# Patient Record
Sex: MALE | Race: WHITE | NOT HISPANIC OR LATINO | URBAN - METROPOLITAN AREA
[De-identification: names, ages, dates, MRNs, and addresses within clinical notes are randomized per-mention and may not be internally consistent; named-entity substitution may affect disease eponyms.]

---

## 2018-01-05 PROBLEM — Z00.00 ENCOUNTER FOR PREVENTIVE HEALTH EXAMINATION: Status: ACTIVE | Noted: 2018-01-05

## 2019-07-19 ENCOUNTER — INPATIENT (INPATIENT)
Facility: HOSPITAL | Age: 59
LOS: 1 days | Discharge: HOME | DRG: 866 | End: 2019-07-21
Attending: INTERNAL MEDICINE | Admitting: INTERNAL MEDICINE
Payer: COMMERCIAL

## 2019-07-19 VITALS
TEMPERATURE: 98 F | RESPIRATION RATE: 22 BRPM | DIASTOLIC BLOOD PRESSURE: 101 MMHG | OXYGEN SATURATION: 99 % | WEIGHT: 240.08 LBS | HEIGHT: 66 IN | HEART RATE: 116 BPM | SYSTOLIC BLOOD PRESSURE: 174 MMHG

## 2019-07-19 DIAGNOSIS — R50.9 FEVER, UNSPECIFIED: ICD-10-CM

## 2019-07-19 DIAGNOSIS — I10 ESSENTIAL (PRIMARY) HYPERTENSION: ICD-10-CM

## 2019-07-19 LAB
ALBUMIN SERPL ELPH-MCNC: 4.9 G/DL — SIGNIFICANT CHANGE UP (ref 3.3–5)
ALP SERPL-CCNC: 89 U/L — SIGNIFICANT CHANGE UP (ref 40–120)
ALT FLD-CCNC: 27 U/L — SIGNIFICANT CHANGE UP (ref 10–45)
ANION GAP SERPL CALC-SCNC: 19 MMOL/L — HIGH (ref 5–17)
APPEARANCE UR: CLEAR — SIGNIFICANT CHANGE UP
APTT BLD: 30.7 SEC — SIGNIFICANT CHANGE UP (ref 27.5–36.3)
AST SERPL-CCNC: 21 U/L — SIGNIFICANT CHANGE UP (ref 10–40)
BACTERIA # UR AUTO: NEGATIVE — SIGNIFICANT CHANGE UP
BASE EXCESS BLDV CALC-SCNC: 1.2 MMOL/L — SIGNIFICANT CHANGE UP (ref -2–2)
BASOPHILS # BLD AUTO: 0.1 K/UL — SIGNIFICANT CHANGE UP (ref 0–0.2)
BASOPHILS NFR BLD AUTO: 0.7 % — SIGNIFICANT CHANGE UP (ref 0–2)
BILIRUB SERPL-MCNC: 0.5 MG/DL — SIGNIFICANT CHANGE UP (ref 0.2–1.2)
BILIRUB UR-MCNC: NEGATIVE — SIGNIFICANT CHANGE UP
BUN SERPL-MCNC: 12 MG/DL — SIGNIFICANT CHANGE UP (ref 7–23)
CA-I SERPL-SCNC: 1.14 MMOL/L — SIGNIFICANT CHANGE UP (ref 1.12–1.3)
CALCIUM SERPL-MCNC: 10.8 MG/DL — HIGH (ref 8.4–10.5)
CHLORIDE BLDV-SCNC: 108 MMOL/L — SIGNIFICANT CHANGE UP (ref 96–108)
CHLORIDE SERPL-SCNC: 94 MMOL/L — LOW (ref 96–108)
CO2 BLDV-SCNC: 26 MMOL/L — SIGNIFICANT CHANGE UP (ref 22–30)
CO2 SERPL-SCNC: 23 MMOL/L — SIGNIFICANT CHANGE UP (ref 22–31)
COLOR SPEC: COLORLESS — SIGNIFICANT CHANGE UP
CREAT SERPL-MCNC: 0.87 MG/DL — SIGNIFICANT CHANGE UP (ref 0.5–1.3)
DIFF PNL FLD: NEGATIVE — SIGNIFICANT CHANGE UP
EOSINOPHIL # BLD AUTO: 0 K/UL — SIGNIFICANT CHANGE UP (ref 0–0.5)
EOSINOPHIL NFR BLD AUTO: 0.1 % — SIGNIFICANT CHANGE UP (ref 0–6)
EPI CELLS # UR: 0 /HPF — SIGNIFICANT CHANGE UP
GAS PNL BLDV: 135 MMOL/L — SIGNIFICANT CHANGE UP (ref 135–145)
GAS PNL BLDV: SIGNIFICANT CHANGE UP
GLUCOSE BLDV-MCNC: 95 MG/DL — SIGNIFICANT CHANGE UP (ref 70–99)
GLUCOSE SERPL-MCNC: 159 MG/DL — HIGH (ref 70–99)
GLUCOSE UR QL: NEGATIVE — SIGNIFICANT CHANGE UP
HCO3 BLDV-SCNC: 25 MMOL/L — SIGNIFICANT CHANGE UP (ref 21–29)
HCT VFR BLD CALC: 44.8 % — SIGNIFICANT CHANGE UP (ref 39–50)
HCT VFR BLDA CALC: 38 % — LOW (ref 39–50)
HGB BLD CALC-MCNC: 12.5 G/DL — LOW (ref 13–17)
HGB BLD-MCNC: 15.3 G/DL — SIGNIFICANT CHANGE UP (ref 13–17)
HYALINE CASTS # UR AUTO: 0 /LPF — SIGNIFICANT CHANGE UP (ref 0–2)
INR BLD: 1.12 RATIO — SIGNIFICANT CHANGE UP (ref 0.88–1.16)
KETONES UR-MCNC: NEGATIVE — SIGNIFICANT CHANGE UP
LACTATE BLDV-MCNC: 2 MMOL/L — SIGNIFICANT CHANGE UP (ref 0.7–2)
LEUKOCYTE ESTERASE UR-ACNC: NEGATIVE — SIGNIFICANT CHANGE UP
LYMPHOCYTES # BLD AUTO: 25.7 % — SIGNIFICANT CHANGE UP (ref 13–44)
LYMPHOCYTES # BLD AUTO: 4.2 K/UL — HIGH (ref 1–3.3)
MAGNESIUM SERPL-MCNC: 1.6 MG/DL — SIGNIFICANT CHANGE UP (ref 1.6–2.6)
MCHC RBC-ENTMCNC: 30.1 PG — SIGNIFICANT CHANGE UP (ref 27–34)
MCHC RBC-ENTMCNC: 34.1 GM/DL — SIGNIFICANT CHANGE UP (ref 32–36)
MCV RBC AUTO: 88.1 FL — SIGNIFICANT CHANGE UP (ref 80–100)
MONOCYTES # BLD AUTO: 1.1 K/UL — HIGH (ref 0–0.9)
MONOCYTES NFR BLD AUTO: 6.9 % — SIGNIFICANT CHANGE UP (ref 2–14)
NEUTROPHILS # BLD AUTO: 11.1 K/UL — HIGH (ref 1.8–7.4)
NEUTROPHILS NFR BLD AUTO: 66.7 % — SIGNIFICANT CHANGE UP (ref 43–77)
NITRITE UR-MCNC: NEGATIVE — SIGNIFICANT CHANGE UP
PCO2 BLDV: 39 MMHG — SIGNIFICANT CHANGE UP (ref 35–50)
PH BLDV: 7.43 — SIGNIFICANT CHANGE UP (ref 7.35–7.45)
PH UR: 7.5 — SIGNIFICANT CHANGE UP (ref 5–8)
PHOSPHATE SERPL-MCNC: 2.1 MG/DL — LOW (ref 2.5–4.5)
PLATELET # BLD AUTO: 404 K/UL — HIGH (ref 150–400)
PO2 BLDV: 34 MMHG — SIGNIFICANT CHANGE UP (ref 25–45)
POTASSIUM BLDV-SCNC: 3.5 MMOL/L — SIGNIFICANT CHANGE UP (ref 3.5–5.3)
POTASSIUM SERPL-MCNC: 3.6 MMOL/L — SIGNIFICANT CHANGE UP (ref 3.5–5.3)
POTASSIUM SERPL-SCNC: 3.6 MMOL/L — SIGNIFICANT CHANGE UP (ref 3.5–5.3)
PROCALCITONIN SERPL-MCNC: 0.06 NG/ML — SIGNIFICANT CHANGE UP (ref 0.02–0.1)
PROT SERPL-MCNC: 8.3 G/DL — SIGNIFICANT CHANGE UP (ref 6–8.3)
PROT UR-MCNC: NEGATIVE — SIGNIFICANT CHANGE UP
PROTHROM AB SERPL-ACNC: 12.8 SEC — SIGNIFICANT CHANGE UP (ref 10–12.9)
RAPID RVP RESULT: SIGNIFICANT CHANGE UP
RBC # BLD: 5.08 M/UL — SIGNIFICANT CHANGE UP (ref 4.2–5.8)
RBC # FLD: 12.4 % — SIGNIFICANT CHANGE UP (ref 10.3–14.5)
RBC CASTS # UR COMP ASSIST: 0 /HPF — SIGNIFICANT CHANGE UP (ref 0–4)
S PYO AG SPEC QL IA: NEGATIVE — SIGNIFICANT CHANGE UP
SAO2 % BLDV: 60 % — LOW (ref 67–88)
SODIUM SERPL-SCNC: 136 MMOL/L — SIGNIFICANT CHANGE UP (ref 135–145)
SP GR SPEC: 1.01 — SIGNIFICANT CHANGE UP (ref 1.01–1.02)
TROPONIN T, HIGH SENSITIVITY RESULT: 9 NG/L — SIGNIFICANT CHANGE UP (ref 0–51)
UROBILINOGEN FLD QL: NEGATIVE — SIGNIFICANT CHANGE UP
WBC # BLD: 16.6 K/UL — HIGH (ref 3.8–10.5)
WBC # FLD AUTO: 16.6 K/UL — HIGH (ref 3.8–10.5)
WBC UR QL: 0 /HPF — SIGNIFICANT CHANGE UP (ref 0–5)

## 2019-07-19 PROCEDURE — 74177 CT ABD & PELVIS W/CONTRAST: CPT | Mod: 26

## 2019-07-19 PROCEDURE — 99284 EMERGENCY DEPT VISIT MOD MDM: CPT

## 2019-07-19 PROCEDURE — 71275 CT ANGIOGRAPHY CHEST: CPT | Mod: 26

## 2019-07-19 RX ORDER — KETOROLAC TROMETHAMINE 30 MG/ML
15 SYRINGE (ML) INJECTION ONCE
Refills: 0 | Status: DISCONTINUED | OUTPATIENT
Start: 2019-07-19 | End: 2019-07-19

## 2019-07-19 RX ORDER — ACETAMINOPHEN 500 MG
650 TABLET ORAL EVERY 6 HOURS
Refills: 0 | Status: DISCONTINUED | OUTPATIENT
Start: 2019-07-19 | End: 2019-07-21

## 2019-07-19 RX ORDER — SODIUM,POTASSIUM PHOSPHATES 278-250MG
1 POWDER IN PACKET (EA) ORAL ONCE
Refills: 0 | Status: COMPLETED | OUTPATIENT
Start: 2019-07-19 | End: 2019-07-19

## 2019-07-19 RX ORDER — HEPARIN SODIUM 5000 [USP'U]/ML
5000 INJECTION INTRAVENOUS; SUBCUTANEOUS EVERY 12 HOURS
Refills: 0 | Status: DISCONTINUED | OUTPATIENT
Start: 2019-07-19 | End: 2019-07-21

## 2019-07-19 RX ORDER — ACETAMINOPHEN 500 MG
650 TABLET ORAL ONCE
Refills: 0 | Status: DISCONTINUED | OUTPATIENT
Start: 2019-07-19 | End: 2019-07-19

## 2019-07-19 RX ORDER — CEFTRIAXONE 500 MG/1
1000 INJECTION, POWDER, FOR SOLUTION INTRAMUSCULAR; INTRAVENOUS EVERY 24 HOURS
Refills: 0 | Status: DISCONTINUED | OUTPATIENT
Start: 2019-07-19 | End: 2019-07-21

## 2019-07-19 RX ORDER — LOSARTAN POTASSIUM 100 MG/1
50 TABLET, FILM COATED ORAL DAILY
Refills: 0 | Status: DISCONTINUED | OUTPATIENT
Start: 2019-07-19 | End: 2019-07-21

## 2019-07-19 RX ORDER — SODIUM CHLORIDE 9 MG/ML
3500 INJECTION INTRAMUSCULAR; INTRAVENOUS; SUBCUTANEOUS ONCE
Refills: 0 | Status: COMPLETED | OUTPATIENT
Start: 2019-07-19 | End: 2019-07-19

## 2019-07-19 RX ORDER — MAGNESIUM SULFATE 500 MG/ML
2 VIAL (ML) INJECTION ONCE
Refills: 0 | Status: COMPLETED | OUTPATIENT
Start: 2019-07-19 | End: 2019-07-19

## 2019-07-19 RX ADMIN — Medication 1 PACKET(S): at 13:16

## 2019-07-19 RX ADMIN — Medication 100 MILLIGRAM(S): at 14:53

## 2019-07-19 RX ADMIN — SODIUM CHLORIDE 3500 MILLILITER(S): 9 INJECTION INTRAMUSCULAR; INTRAVENOUS; SUBCUTANEOUS at 11:59

## 2019-07-19 RX ADMIN — Medication 15 MILLIGRAM(S): at 11:59

## 2019-07-19 RX ADMIN — CEFTRIAXONE 100 MILLIGRAM(S): 500 INJECTION, POWDER, FOR SOLUTION INTRAMUSCULAR; INTRAVENOUS at 14:53

## 2019-07-19 RX ADMIN — Medication 50 GRAM(S): at 13:16

## 2019-07-19 RX ADMIN — HEPARIN SODIUM 5000 UNIT(S): 5000 INJECTION INTRAVENOUS; SUBCUTANEOUS at 18:39

## 2019-07-19 NOTE — ED ADULT NURSE NOTE - OBJECTIVE STATEMENT
58 year old A&Ox3 male presents to ED with steady coordinated gait complaining of "sweating and illness since July 4th." PMH HTN and elevated WBC, has been worked up by heme onc. Confirms night sweats, 10lb weight loss in past week, and generalized malaise x 3 weeks. Patient went to PCP last week for same complaints, prescribed amoxicillin, currently on day 6 with little improvement.  Confirms intermittent SOB along with productive cough, lungs CTA bilaterally, presents tachycardic in low 100s and febrile, [facility] [place] aware. Last dose of tylenol at 7am. Denies CP, n/v/d, fevers, chills, abdominal pain, urinary symptoms, weakness, fatigue, numbness, tingling in upper and lower extremities, HA, blurry vision. VSS updated on plan of care.

## 2019-07-19 NOTE — CONSULT NOTE ADULT - ASSESSMENT
58 year old male with hx of leukocytosis, prior work up is negative x 2 as per pt, who presents with a febrile ill, cough and malaise which started one week ago.   he was started on a recent empiric course of amoxicillin this week that was prescribed by his PMD, in the ED he was found to have a fever to 100.7 and he also had a CT of chest abdomen and pelvis, the CT of chest was negative for PE  and there was no mention of pneumonia   Noted that his white count was 16K, but his base line is 15K as per patient.   He currently feels better   Not clear dx at this time, but a viral illness is in the ddx , he also has blood cultures that are in progress, bacteremia?     Plan   will start empiric CTX 1 g IV q24 and doxycycline 100 mg po bid pending further cx data and also follow up blood cx and respiratory viral panel   continue supportive care as per medicine service

## 2019-07-19 NOTE — ED PROVIDER NOTE - PROGRESS NOTE DETAILS
PT Hr improved hr 98, Pt no longer diaphoretic,feels improved. ct/s pending  Marv Saleh MD, Facep PA Elvis: disc OSCAR Leal: PMD Dr. singh called ED attending Dr. Saleh who recommended admission to Dr. Thomposn for further w/u given persistent symptoms for three weeks

## 2019-07-19 NOTE — H&P ADULT - HISTORY OF PRESENT ILLNESS
59 y/o male PMHx HTN presented to the ED c/o productive cough white sputum x1 week. Patient stated he has been having chills, night sweats, throat pain, generalized malaise and 10 pound unintentional weight loss in last 2 weeks. Patient started to have productive cough this week with white sputum production with no improvement of amoxicillin he was prescribed by PMD. Patient has had diarrhea since he started the amoxicillin. 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 Patient started to feel SOB today and exacerbated with ambulation. Patient denied travel outside the counter, CP, abdominal pain, N/V, rash, headache, neck pain, dizziness, weakness, sick contacts, hiking, living in heavily wooded areas . Patient stated he is currently being worked up by heme/onc for leukocytosis seen on outpatient labs. Patient smokes marijuana and uses a vape. Patient took Tylenol this morning prior to arrival

## 2019-07-19 NOTE — ED ADULT NURSE NOTE - CAS TRG GENERAL NORM CIRC DET
GONIO TESTING HAS BEEN ORDERED AND PERFORMED TODAY TO EVALUATE THE ANGLES FOR THE DIAGNOSIS OF GLAUCOMA SUSPECT. ANGLES ARE OPEN TODAY. Strong peripheral pulses/Capillary refill less/equal to 2 seconds

## 2019-07-19 NOTE — ED PROVIDER NOTE - OBJECTIVE STATEMENT
59 y/o male PMHx HTN presented to the ED c/o productive cough white sputum x1 week. Patient stated he has been having chills, night sweats, throat pain, generalized malaise and 10 pound unintentional weight loss in last 2 weeks. Patient started to have productive cough this week with white sputum production with no improvement of amoxicillin he was prescribed by PMD. Patient has had diarrhea since he started the amoxicillin. 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 Patient started to feel SOB today and exacerbated with ambulation. Patient denied travel outside the counter, CP, abdominal pain, N/V, rash, headache, neck pain, dizziness, weakness, sick contacts, hiking, living in heavily wooded areas . Patient stated he is currently being worked up by heme/onc for leukocytosis seen on outpatient labs. Patient smokes marijuana and uses a vape 57 y/o male PMHx HTN presented to the ED c/o productive cough white sputum x1 week. Patient stated he has been having chills, night sweats, throat pain, generalized malaise and 10 pound unintentional weight loss in last 2 weeks. Patient started to have productive cough this week with white sputum production with no improvement of amoxicillin he was prescribed by PMD. Patient has had diarrhea since he started the amoxicillin. 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 Patient started to feel SOB today and exacerbated with ambulation. Patient denied travel outside the counter, CP, abdominal pain, N/V, rash, headache, neck pain, dizziness, weakness, sick contacts, hiking, living in heavily wooded areas . Patient stated he is currently being worked up by heme/onc for leukocytosis seen on outpatient labs. Patient smokes marijuana and uses a vape. Patient took Tylenol this morning prior to arrival

## 2019-07-19 NOTE — H&P ADULT - ASSESSMENT
58 m with    Fever  - ESR, CRP  - panculture  - empiric antibiotics  - ID evaluation    Splenic lesion  - Echocardiogram  - US spleen    HTN  - control    Further action as per clinical course   Faheem Thompson MD pager [phone]

## 2019-07-19 NOTE — CONSULT NOTE ADULT - SUBJECTIVE AND OBJECTIVE BOX
HPI:   Patient is a 58y male past medical history of leukocytosis, prior heme work up have been negative, baseline white count he reports is 15K, who presents with chief complaint of malaise, cough  no feeling well and sweats at home which started last week. He reports that he went to his PMD this week and he was prescribed amoxicillin for a possible upper respiratory infection, but he reports that he was told that it was not clear. He started to take amoxicillin but report he developed diarrhea. He reports that the stool was not watery and at was only a few times. He denies any recent travel history. Two months ago he reports he went to [place] for vacation he stayed at a resort. In the ER he was noted to have a fever to 100.7, He also received in ER toradol, tylenol and fluids and he reports that he is feeling better now.     REVIEW OF SYSTEMS:  All other review of systems negative (Comprehensive ROS)    PAST MEDICAL & SURGICAL HISTORY:  HTN (hypertension)      Allergies    No Known Allergies    Intolerances            FAMILY HISTORY: non contributory       SOCIAL HISTORY:  Smoking:  he reports that he only smokes marijuana     T(F): 100.7 (19 @ 11:51), Max: 100.7 (19 @ 11:51)  HR: 81 (19 @ 13:45)  BP: 146/75 (19 @ 13:45)  RR: 16 (19 @ 13:45)  SpO2: 98% (19 @ 13:45)  Wt(kg): --    PHYSICAL EXAM:  General: alert, no acute distress  Eyes:  anicteric, no conjunctival injection, no discharge  Oropharynx: no lesions or injection 	  Neck: supple, without adenopathy  Lungs: clear to auscultation  Heart: regular rate and rhythm; no murmur, rubs or gallops  Abdomen: soft, nondistended, nontender, without mass or organomegaly  Skin: no lesions  Extremities: no clubbing, cyanosis, or edema  Neurologic: alert, oriented, moves all extremities    LAB RESULTS:                        15.3   16.6  )-----------( 404      ( 2019 11:50 )             44.8         136  |  94<L>  |  12  ----------------------------<  159<H>  3.6   |  23  |  0.87    Ca    10.8<H>      2019 11:50  Phos  2.1       Mg     1.6         TPro  8.3  /  Alb  4.9  /  TBili  0.5  /  DBili  x   /  AST  21  /  ALT  27  /  AlkPhos  89      LIVER FUNCTIONS - ( 2019 11:50 )  Alb: 4.9 g/dL / Pro: 8.3 g/dL / ALK PHOS: 89 U/L / ALT: 27 U/L / AST: 21 U/L / GGT: x           Urinalysis Basic - ( 2019 13:45 )    Color: Colorless / Appearance: Clear / S.010 / pH: x  Gluc: x / Ketone: Negative  / Bili: Negative / Urobili: Negative   Blood: x / Protein: Negative / Nitrite: Negative   Leuk Esterase: Negative / RBC: 0 /hpf / WBC 0 /HPF   Sq Epi: x / Non Sq Epi: 0 /hpf / Bacteria: Negative        MICROBIOLOGY:  RECENT CULTURES:        RADIOLOGY REVIEWED:      Antimicrobials Day #      Other Medications:

## 2019-07-19 NOTE — ED PROVIDER NOTE - ATTENDING CONTRIBUTION TO CARE
Private Physician Panchito Cramer  58y male pmh HTN, Prior hx of chronic high WBC (15k), Cannibus. pt comes to ed complains of sweats, shakes, cold sweats onset past week. With weght loss. Pt missed work past two days with fatigue with  cough with whitish sputum. Seen by pmd and tx with Amoxil without improvement. No hemoplthysis, no nausea and vomiting  has mild diarrhea after abx. SOB/MONSIVAIS. PE WDWN male looking mildly ill normocephalic atraumatic chest clear anterior & posterior abd soft +bs no mass guarding. Neruo no focal defects. CV no rubs, gallops or murmurs  Marv Saleh MD, Facep

## 2019-07-19 NOTE — ED ADULT NURSE NOTE - NSIMPLEMENTINTERV_GEN_ALL_ED
Implemented All Universal Safety Interventions:  [place] to call system. Call bell, personal items and telephone within reach. Instruct patient to call for assistance. Room bathroom lighting operational. Non-slip footwear when patient is off stretcher. Physically safe environment: no spills, clutter or unnecessary equipment. Stretcher in lowest position, wheels locked, appropriate side rails in place.

## 2019-07-19 NOTE — H&P ADULT - NSHPLABSRESULTS_GEN_ALL_CORE
15.3   16.6  )-----------( 404      ( 2019 11:50 )             44.8           136  |  94<L>  |  12  ----------------------------<  159<H>  3.6   |  23  |  0.87    Ca    10.8<H>      2019 11:50  Phos  2.1       Mg     1.6         TPro  8.3  /  Alb  4.9  /  TBili  0.5  /  DBili  x   /  AST  21  /  ALT  27  /  AlkPhos  89                Urinalysis Basic - ( 2019 13:45 )    Color: Colorless / Appearance: Clear / S.010 / pH: x  Gluc: x / Ketone: Negative  / Bili: Negative / Urobili: Negative   Blood: x / Protein: Negative / Nitrite: Negative   Leuk Esterase: Negative / RBC: 0 /hpf / WBC 0 /HPF   Sq Epi: x / Non Sq Epi: 0 /hpf / Bacteria: Negative        PT/INR - ( 2019 11:50 )   PT: 12.8 sec;   INR: 1.12 ratio         PTT - ( 2019 11:50 )  PTT:30.7 sec    < from: CT Abdomen and Pelvis w/ IV Cont (19 @ 13:29) >    Impression: No pulmonary embolus is noted within the main,right and left   main, lobar and proximal segmental pulmonary artery branches bilaterally.   Evaluation of the mid to distal segmental and subsegmental pulmonary   artery branches is limited.     2 cm low-attenuation lesion in the spleen is indeterminate based on this   exam. Further evaluation with sonogram is recommended.    < end of copied text >

## 2019-07-19 NOTE — ED PROVIDER NOTE - CLINICAL SUMMARY MEDICAL DECISION MAKING FREE TEXT BOX
Pt with 3wk+ hx (according to pmd) cough weight loss sweats fatigue weakness,not responding to opt abx now in ed with fever diaphoresis. Concerns for fuo/occult infection, TBA culture. Inpatient workup.  Marv Saleh MD, Facep

## 2019-07-19 NOTE — H&P ADULT - NSHPPHYSICALEXAM_GEN_ALL_CORE
PHYSICAL EXAMINATION:  Vital Signs Last 24 Hrs  T(C): 36.9 (19 Jul 2019 17:03), Max: 38.2 (19 Jul 2019 11:51)  T(F): 98.4 (19 Jul 2019 17:03), Max: 100.7 (19 Jul 2019 11:51)  HR: 75 (19 Jul 2019 17:03) (75 - 116)  BP: 148/- (19 Jul 2019 17:03) (146/75 - 184/110)  BP(mean): --  RR: 18 (19 Jul 2019 17:03) (16 - 22)  SpO2: 98% (19 Jul 2019 17:03) (96% - 100%)  CAPILLARY BLOOD GLUCOSE          GENERAL: NAD, well-groomed, well-developed  HEAD:  atraumatic, normocephalic  EYES: sclera anicteric  ENMT: mucous membranes moist  NECK: supple, No JVD  CHEST/LUNG: clear to auscultation bilaterally; no rales, rhonchi, or wheezing b/l  HEART: normal S1, S2  ABDOMEN: BS+, soft, ND, NT   EXTREMITIES:  pulses palpable; no clubbing, cyanosis, or edema b/l LEs  NEURO: awake, alert, interactive; moves all extremities  SKIN: no rashes or lesions

## 2019-07-19 NOTE — PATIENT PROFILE ADULT - NSPROHMCARDIOMGMTSTRAT_GEN_A_NUR
coping strategies/activity/diet modification/exercise/medication therapy/routine screening/weight management/adequate rest/fluid modification

## 2019-07-19 NOTE — H&P ADULT - NSHPREVIEWOFSYSTEMS_GEN_ALL_CORE
REVIEW OF SYSTEMS:    CONSTITUTIONAL: + weakness, + fevers no chills  EYES/ENT: No visual changes;  No vertigo or throat pain   NECK: No pain or stiffness  RESPIRATORY: No cough, wheezing, hemoptysis; No shortness of breath  CARDIOVASCULAR: No chest pain or palpitations  GASTROINTESTINAL: No abdominal or epigastric pain. No nausea, vomiting, or hematemesis; No diarrhea or constipation. No melena or hematochezia.  GENITOURINARY: No dysuria, frequency or hematuria  NEUROLOGICAL: No numbness or weakness  SKIN: No itching, burning, rashes, or lesions   All other review of systems is negative unless indicated above.

## 2019-07-20 LAB
ANION GAP SERPL CALC-SCNC: 15 MMOL/L — SIGNIFICANT CHANGE UP (ref 5–17)
BUN SERPL-MCNC: 12 MG/DL — SIGNIFICANT CHANGE UP (ref 7–23)
CALCIUM SERPL-MCNC: 8.7 MG/DL — SIGNIFICANT CHANGE UP (ref 8.4–10.5)
CHLORIDE SERPL-SCNC: 102 MMOL/L — SIGNIFICANT CHANGE UP (ref 96–108)
CO2 SERPL-SCNC: 22 MMOL/L — SIGNIFICANT CHANGE UP (ref 22–31)
CREAT SERPL-MCNC: 0.91 MG/DL — SIGNIFICANT CHANGE UP (ref 0.5–1.3)
CRP SERPL-MCNC: 0.3 MG/DL — SIGNIFICANT CHANGE UP (ref 0–0.4)
CULTURE RESULTS: NO GROWTH — SIGNIFICANT CHANGE UP
ERYTHROCYTE [SEDIMENTATION RATE] IN BLOOD: 13 MM/HR — SIGNIFICANT CHANGE UP (ref 0–20)
GLUCOSE SERPL-MCNC: 106 MG/DL — HIGH (ref 70–99)
HCT VFR BLD CALC: 37.6 % — LOW (ref 39–50)
HCV AB S/CO SERPL IA: 0.11 S/CO — SIGNIFICANT CHANGE UP (ref 0–0.99)
HCV AB SERPL-IMP: SIGNIFICANT CHANGE UP
HGB BLD-MCNC: 12.4 G/DL — LOW (ref 13–17)
MCHC RBC-ENTMCNC: 28.7 PG — SIGNIFICANT CHANGE UP (ref 27–34)
MCHC RBC-ENTMCNC: 33 GM/DL — SIGNIFICANT CHANGE UP (ref 32–36)
MCV RBC AUTO: 87 FL — SIGNIFICANT CHANGE UP (ref 80–100)
PLATELET # BLD AUTO: 330 K/UL — SIGNIFICANT CHANGE UP (ref 150–400)
POTASSIUM SERPL-MCNC: 3.7 MMOL/L — SIGNIFICANT CHANGE UP (ref 3.5–5.3)
POTASSIUM SERPL-SCNC: 3.7 MMOL/L — SIGNIFICANT CHANGE UP (ref 3.5–5.3)
RBC # BLD: 4.32 M/UL — SIGNIFICANT CHANGE UP (ref 4.2–5.8)
RBC # FLD: 13.6 % — SIGNIFICANT CHANGE UP (ref 10.3–14.5)
SODIUM SERPL-SCNC: 139 MMOL/L — SIGNIFICANT CHANGE UP (ref 135–145)
SPECIMEN SOURCE: SIGNIFICANT CHANGE UP
WBC # BLD: 13.44 K/UL — HIGH (ref 3.8–10.5)
WBC # FLD AUTO: 13.44 K/UL — HIGH (ref 3.8–10.5)

## 2019-07-20 RX ORDER — ACETAMINOPHEN 500 MG
0 TABLET ORAL
Qty: 0 | Refills: 0 | DISCHARGE

## 2019-07-20 RX ORDER — LOSARTAN/HYDROCHLOROTHIAZIDE 100MG-25MG
1 TABLET ORAL
Qty: 0 | Refills: 0 | DISCHARGE

## 2019-07-20 RX ADMIN — CEFTRIAXONE 100 MILLIGRAM(S): 500 INJECTION, POWDER, FOR SOLUTION INTRAMUSCULAR; INTRAVENOUS at 13:48

## 2019-07-20 RX ADMIN — LOSARTAN POTASSIUM 50 MILLIGRAM(S): 100 TABLET, FILM COATED ORAL at 05:06

## 2019-07-20 RX ADMIN — Medication 100 MILLIGRAM(S): at 13:47

## 2019-07-20 RX ADMIN — HEPARIN SODIUM 5000 UNIT(S): 5000 INJECTION INTRAVENOUS; SUBCUTANEOUS at 05:06

## 2019-07-20 RX ADMIN — Medication 100 MILLIGRAM(S): at 01:06

## 2019-07-20 RX ADMIN — HEPARIN SODIUM 5000 UNIT(S): 5000 INJECTION INTRAVENOUS; SUBCUTANEOUS at 20:43

## 2019-07-21 ENCOUNTER — TRANSCRIPTION ENCOUNTER (OUTPATIENT)
Age: 59
End: 2019-07-21

## 2019-07-21 VITALS
DIASTOLIC BLOOD PRESSURE: 78 MMHG | TEMPERATURE: 98 F | HEART RATE: 68 BPM | OXYGEN SATURATION: 95 % | SYSTOLIC BLOOD PRESSURE: 147 MMHG | RESPIRATION RATE: 18 BRPM

## 2019-07-21 DIAGNOSIS — D73.9 DISEASE OF SPLEEN, UNSPECIFIED: ICD-10-CM

## 2019-07-21 LAB
CULTURE RESULTS: SIGNIFICANT CHANGE UP
HCT VFR BLD CALC: 39.2 % — SIGNIFICANT CHANGE UP (ref 39–50)
HGB BLD-MCNC: 12.9 G/DL — LOW (ref 13–17)
MCHC RBC-ENTMCNC: 29.6 PG — SIGNIFICANT CHANGE UP (ref 27–34)
MCHC RBC-ENTMCNC: 32.9 GM/DL — SIGNIFICANT CHANGE UP (ref 32–36)
MCV RBC AUTO: 89.9 FL — SIGNIFICANT CHANGE UP (ref 80–100)
PLATELET # BLD AUTO: 334 K/UL — SIGNIFICANT CHANGE UP (ref 150–400)
RBC # BLD: 4.36 M/UL — SIGNIFICANT CHANGE UP (ref 4.2–5.8)
RBC # FLD: 13.5 % — SIGNIFICANT CHANGE UP (ref 10.3–14.5)
SPECIMEN SOURCE: SIGNIFICANT CHANGE UP
WBC # BLD: 14.54 K/UL — HIGH (ref 3.8–10.5)
WBC # FLD AUTO: 14.54 K/UL — HIGH (ref 3.8–10.5)

## 2019-07-21 PROCEDURE — 85730 THROMBOPLASTIN TIME PARTIAL: CPT

## 2019-07-21 PROCEDURE — 96374 THER/PROPH/DIAG INJ IV PUSH: CPT | Mod: XU

## 2019-07-21 PROCEDURE — 87040 BLOOD CULTURE FOR BACTERIA: CPT

## 2019-07-21 PROCEDURE — 99285 EMERGENCY DEPT VISIT HI MDM: CPT | Mod: 25

## 2019-07-21 PROCEDURE — 87486 CHLMYD PNEUM DNA AMP PROBE: CPT

## 2019-07-21 PROCEDURE — 96375 TX/PRO/DX INJ NEW DRUG ADDON: CPT | Mod: XU

## 2019-07-21 PROCEDURE — 84132 ASSAY OF SERUM POTASSIUM: CPT

## 2019-07-21 PROCEDURE — 82803 BLOOD GASES ANY COMBINATION: CPT

## 2019-07-21 PROCEDURE — 84484 ASSAY OF TROPONIN QUANT: CPT

## 2019-07-21 PROCEDURE — 87581 M.PNEUMON DNA AMP PROBE: CPT

## 2019-07-21 PROCEDURE — 80048 BASIC METABOLIC PNL TOTAL CA: CPT

## 2019-07-21 PROCEDURE — 83605 ASSAY OF LACTIC ACID: CPT

## 2019-07-21 PROCEDURE — 84145 PROCALCITONIN (PCT): CPT

## 2019-07-21 PROCEDURE — 87086 URINE CULTURE/COLONY COUNT: CPT

## 2019-07-21 PROCEDURE — 87633 RESP VIRUS 12-25 TARGETS: CPT

## 2019-07-21 PROCEDURE — 85610 PROTHROMBIN TIME: CPT

## 2019-07-21 PROCEDURE — 87798 DETECT AGENT NOS DNA AMP: CPT

## 2019-07-21 PROCEDURE — 82330 ASSAY OF CALCIUM: CPT

## 2019-07-21 PROCEDURE — 84295 ASSAY OF SERUM SODIUM: CPT

## 2019-07-21 PROCEDURE — 74177 CT ABD & PELVIS W/CONTRAST: CPT

## 2019-07-21 PROCEDURE — 87880 STREP A ASSAY W/OPTIC: CPT

## 2019-07-21 PROCEDURE — 85014 HEMATOCRIT: CPT

## 2019-07-21 PROCEDURE — 83880 ASSAY OF NATRIURETIC PEPTIDE: CPT

## 2019-07-21 PROCEDURE — 93005 ELECTROCARDIOGRAM TRACING: CPT

## 2019-07-21 PROCEDURE — 81001 URINALYSIS AUTO W/SCOPE: CPT

## 2019-07-21 PROCEDURE — 85652 RBC SED RATE AUTOMATED: CPT

## 2019-07-21 PROCEDURE — 71275 CT ANGIOGRAPHY CHEST: CPT

## 2019-07-21 PROCEDURE — 85027 COMPLETE CBC AUTOMATED: CPT

## 2019-07-21 PROCEDURE — 82947 ASSAY GLUCOSE BLOOD QUANT: CPT

## 2019-07-21 PROCEDURE — 82435 ASSAY OF BLOOD CHLORIDE: CPT

## 2019-07-21 PROCEDURE — 87081 CULTURE SCREEN ONLY: CPT

## 2019-07-21 PROCEDURE — 84100 ASSAY OF PHOSPHORUS: CPT

## 2019-07-21 PROCEDURE — 80053 COMPREHEN METABOLIC PANEL: CPT

## 2019-07-21 PROCEDURE — 86803 HEPATITIS C AB TEST: CPT

## 2019-07-21 PROCEDURE — 86140 C-REACTIVE PROTEIN: CPT

## 2019-07-21 PROCEDURE — 83735 ASSAY OF MAGNESIUM: CPT

## 2019-07-21 RX ADMIN — Medication 100 MILLIGRAM(S): at 01:59

## 2019-07-21 RX ADMIN — HEPARIN SODIUM 5000 UNIT(S): 5000 INJECTION INTRAVENOUS; SUBCUTANEOUS at 09:12

## 2019-07-21 RX ADMIN — LOSARTAN POTASSIUM 50 MILLIGRAM(S): 100 TABLET, FILM COATED ORAL at 05:29

## 2019-07-21 NOTE — DISCHARGE NOTE PROVIDER - NSDCCPCAREPLAN_GEN_ALL_CORE_FT
PRINCIPAL DISCHARGE DIAGNOSIS  Diagnosis: Fever of unknown origin  Assessment and Plan of Treatment:       SECONDARY DISCHARGE DIAGNOSES  Diagnosis: HTN (hypertension)  Assessment and Plan of Treatment: HTN (hypertension) PRINCIPAL DISCHARGE DIAGNOSIS  Diagnosis: Fever of unknown origin  Assessment and Plan of Treatment: - cultures are negative   - discontinue antibiotics  - ID cleared for DC as per d/w Dr. Ford  will f/u with pmd in 2-3 days      SECONDARY DISCHARGE DIAGNOSES  Diagnosis: HTN (hypertension)  Assessment and Plan of Treatment: HTN (hypertension)  controlled throughout admission  c/w home meds      Diagnosis: Splenic lesion  Assessment and Plan of Treatment: Splenic lesion will be worked up as OTP d/w Dr. Cramer  - Echocardiogram as out-pt  - US spleen as out-pt

## 2019-07-21 NOTE — PROGRESS NOTE ADULT - PROVIDER SPECIALTY LIST ADULT
Gastroenterology
Gastroenterology
Infectious Disease
Infectious Disease
Internal Medicine
Internal Medicine
Gastroenterology

## 2019-07-21 NOTE — DISCHARGE NOTE PROVIDER - HOSPITAL COURSE
This  is a 58y old male who presents on 7/19 with a chief complaint of  fever cough anorexia weight loss 2 weeks; with additional hx of leukocytosis admitted with fever chills temp 100. Pt with iv fluids for dehydration with improvement. ID following for evaluation to rule out viral syndrome sepsis endocarditis. Pt. had - blood cultures, urine, cxray. Antibiotics as per id doxycycline and  much improved, afebrile after 48 hours. For HTN (hypertension) pt. monitored throughout admission. On 7/21  - seen by This is a 58y old male who presents on 7/19 with a chief complaint of  fever cough anorexia weight loss 2 weeks; with additional hx of leukocytosis admitted with fever chills temp 100. Pt with iv fluids for dehydration with improvement. ID following for evaluation to rule out viral syndrome sepsis endocarditis. Pt. had - blood cultures, urine, cxray. Antibiotics as per id doxycycline / ceftriaxone and clinically pt. improved, afebrile after 48 hours. For HTN (hypertension) pt. monitored throughout admission. On 7/21 - seen by Dr. Thompson with fever resolved, cultures are negative, dc'd antibiotics. As per ID cleared for DC after d/w Dr. Ford. Findings of splenic lesion will be worked up as out-pt. as per d/w Dr. Cramer. Additionally pt. will have f/u with  Echocardiogram and U/S spleen as an out-pt. As above HTN controlled and will c/w home meds. Dc'd  home on 7/21, will see PMD in 2-3 days. QA This is a 58y old male who presents on 7/19 with a chief complaint of  fever cough anorexia weight loss 2 weeks; with additional hx of leukocytosis admitted with fever chills temp 100. Pt with iv fluids for dehydration with improvement. ID following for evaluation to rule out viral syndrome sepsis endocarditis. Pt. had - blood cultures, urine, cxray. Antibiotics as per id doxycycline / ceftriaxone and clinically pt. improved, afebrile after 48 hours. For HTN (hypertension) pt. monitored throughout admission. On 7/21 - seen by Dr. Thompson with fever resolved, cultures are negative, dc'd antibiotics. As per ID cleared for DC after d/w Dr. Ford. Findings of splenic lesion will be worked up as out-pt. as per d/w Dr. Cramer. Additionally pt. will have f/u with  Echocardiogram and U/S spleen as an out-pt. As above HTN controlled and will c/w home meds. Dc'd  home on 7/21, will see PMD in 2-3 days.

## 2019-07-21 NOTE — PROGRESS NOTE ADULT - PROBLEM SELECTOR PLAN 1
hydration iv fluids blood urine cultures  id consult echo JUAN PABLO as indicated  cbc cp resp panel pending start broad spectrum antibiotics
hydration iv fluids blood urine cultures  id consult echo JUAN PABLO as indicated  cbc cp resp panel
hydration iv fluids blood urine cultures  id consult echo JUAN PABLO as indicated  cbc cp resp panel  neg pending start broad spectrum antibiotics wbc down  discharge on antibiotics further work up as outpatient if cleared by id

## 2019-07-21 NOTE — PROGRESS NOTE ADULT - ASSESSMENT
pt with hx lwukocytosis admitted with fever chills temp 100plus chills dehydrated for evaluation rule out viral syndrome sepsis endocarditis   id consult blood cultures  urine cxrayantibiotics as per id doxycycline plus after cultures
58 m with    Fever  - cultures pending   - empiric antibiotics  - ID evaluation    Splenic lesion  - Echocardiogram  - US spleen    HTN  - control  Faheem Thompson MD pager [phone]
58 m with    Fever resolved  - cultures are negative   - discontinue antibiotics  - ID cleared for DC. d/w Dr. Ford    Splenic lesion will be worked up as OTP d/w Dr. Cramer  - Echocardiogram as OTP  - US spleen as OTP    HTN  - control    DC home. Follow with PMD in 2-3 days. LALITO Thompson MD pager [phone]
58 year old male with hx of leukocytosis, prior work up is negative x 2 as per pt, who presents with a febrile ill, cough and malaise which started one week ago.   he was started on a recent empiric course of amoxicillin this week that was prescribed by his PMD, in the ED he was found to have a fever to 100.7 and he also had a CT of chest abdomen and pelvis, the CT of chest was negative for PE  and there was no mention of pneumonia   Noted that his white count was 13K,  He currently feels better   Not clear dx at this time, but a viral illness is in the ddx   reviewed his RVP is reported as not detected   micro reviewed and his blood culture results are pending  Clinically he feels better, and fever have resolved, and he is non toxic appearing     Plan   day # 2 of antibiotics   continue with ctx and doxycycline for now pending blood cx results
58 year old male with hx of leukocytosis, prior work up is negative x 2 as per pt, who presents with a febrile ill, cough and malaise which started one week ago.   he was started on a recent empiric course of amoxicillin this week that was prescribed by his PMD, in the ED he was found to have a fever to 100.7 and he also had a CT of chest abdomen and pelvis, the CT of chest was negative for PE  and there was no mention of pneumonia   Noted that his white count was 14  i suspect the patient may have had a viral illness now resolved  reviewed his RVP is reported as not detected   micro reviewed and his blood culture reported as negative     Plan   day # 3 of antibiotics   no bacterial infection found, his cultures were negative, DC antibiotics   reviewed case with Dr Thompson no objection for DC planning from ID point of view
pt with hx leukocytosis admitted with fever chills temp 100plus chills dehydrated for evaluation rule out viral syndrome sepsis endocarditis   id consult blood cultures  urine cxrayantibiotics as per id doxycycline plus after cultures  much improved afebrile 48 hours
pt with hx lwukocytosis admitted with fever chills temp 100plus chills dehydrated for evaluation rule out viral syndrome sepsis endocarditis   id consult blood cultures  urine cxrayantibiotics as per id doxycycline plus after cultures

## 2019-07-21 NOTE — PROGRESS NOTE ADULT - SUBJECTIVE AND OBJECTIVE BOX
Subjective: Patient is a 58y old  Male who presents with a chief complaint of  fever cough anorexia weight loss 2 weeks, has hx of leukocytosis for several years work up heme negative seen in er 2 weeks ago ct abdomen chest negativen amoxacillin no relief seen by me  no improvement hospitalize    PAST MEDICAL & SURGICAL HISTORY:  HTN (hypertension)      Allergies    No Known Allergies    Intolerances        MEDICATIONS  (STANDING):  cefTRIAXone   IVPB 1000 milliGRAM(s) IV Intermittent every 24 hours  doxycycline hyclate Capsule 100 milliGRAM(s) Oral every 12 hours    MEDICATIONS  (PRN):      CONSTITUTIONALfatigue temps 100 10lb weight lss  EYES: No eye pain, visual disturbances, or discharge  ENMT:  No difficulty hearing, tinnitus, vertigo; No sinus or throat pain  NECK: No pain or stiffness  BREASTS: No pain, masses, or nipple discharge  RESPIRATORY: No cough, wheezing, chills or hemoptysis; No shortness of breath  CARDIOVASCULAR: No chest pain, palpitations, dizziness, or leg swelling  GASTROINTESTINAL: No abdominal or epigastric pain. No vomiting, or hematemesis; No diarrhea or constipation. No melena or hematochezia. anorexic  some mausea  GENITOURINARY: No dysuria, frequency, hematuria, or incontinence  NEUROLOGICAL: No headaches, memory loss, loss of strength, numbness, or tremors  SKIN: No itching, burning, rashes, or lesions   LYMPH NODES: No enlarged glands  ENDOCRINE: No heat or cold intolerance; No hair loss  MUSCULOSKELETAL: No joint pain or swelling; No muscle, back, or extremity pain  PSYCHIATRIC: No depression, anxiety, mood swings, or difficulty sleeping  HEME/LYMPH: No easy bruising, or bleeding gums  ALLERY AND IMMUNOLOGIC: No hives or eczema      PHYSICAL EXAM:    GENERAL: Comfortable, no acute distress   HEAD:  Normocephalic, atraumatic  EYES: EOMI, PERRLA  HEENT: Moist mucous membranes  NECK: Supple, No JVD  NERVOUS SYSTEM:  Alert & Oriented X3, Motor Strength 5/5 B/L upper and lower extremities  CHEST/LUNG: Clear to auscultation bilaterally  HEART: Regular rate and rhythm  ABDOMEN: Soft, Nontender, Nondistended, Bowel sounds present  GENITOURINARY: Voiding, no palpable bladder  EXTREMITIES:   No clubbing, cyanosis, or edema  MUSCULOSKELTAL- No muscle tenderness, no joint tenderness  SKIN-no rash            Vital Signs Last 24 Hrs  T(C): 36.9 (2019 14:45), Max: 38.2 (2019 11:51)  T(F): 98.5 (2019 14:45), Max: 100.7 (2019 11:51)  HR: 89 (2019 14:45) (81 - 116)  BP: 169/79 (2019 14:45) (146/75 - 184/110)  BP(mean): --  RR: 18 (2019 14:45) (16 - 22)  SpO2: 99% (2019 14:45) (98% - 100%)      LABS:        136  |  94<L>  |  12  ----------------------------<  159<H>  3.6   |  23  |  0.87    Ca    10.8<H>      2019 11:50  Phos  2.1       Mg     1.6         TPro  8.3  /  Alb  4.9  /  TBili  0.5  /  DBili  x   /  AST  21  /  ALT  27  /  AlkPhos  89      CBC Full  -  ( 2019 11:50 )  WBC Count : 16.6 K/uL  RBC Count : 5.08 M/uL  Hemoglobin : 15.3 g/dL  Hematocrit : 44.8 %  Platelet Count - Automated : 404 K/uL  Mean Cell Volume : 88.1 fl  Mean Cell Hemoglobin : 30.1 pg  Mean Cell Hemoglobin Concentration : 34.1 gm/dL  Auto Neutrophil # : 11.1 K/uL  Auto Lymphocyte # : 4.2 K/uL  Auto Monocyte # : 1.1 K/uL  Auto Eosinophil # : 0.0 K/uL  Auto Basophil # : 0.1 K/uL  Auto Neutrophil % : 66.7 %  Auto Lymphocyte % : 25.7 %  Auto Monocyte % : 6.9 %  Auto Eosinophil % : 0.1 %  Auto Basophil % : 0.7 %    Urinalysis Basic - ( 2019 13:45 )    Color: Colorless / Appearance: Clear / S.010 / pH: x  Gluc: x / Ketone: Negative  / Bili: Negative / Urobili: Negative   Blood: x / Protein: Negative / Nitrite: Negative   Leuk Esterase: Negative / RBC: 0 /hpf / WBC 0 /HPF   Sq Epi: x / Non Sq Epi: 0 /hpf / Bacteria: Negative      LIVER FUNCTIONS - ( 2019 11:50 )  Alb: 4.9 g/dL / Pro: 8.3 g/dL / ALK PHOS: 89 U/L / ALT: 27 U/L / AST: 21 U/L / GGT: x                 < from: CT Abdomen and Pelvis w/ IV Cont (19 @ 13:29) >  EXAM:  CT ABDOMEN AND PELVIS IC                          EXAM:  CT ANGIO CHEST (W)AW IC                            PROCEDURE DATE:  2019            INTERPRETATION:  Clinical information: Fever. Shortness of breath.   Evaluate for pulmonary embolus.    CT angiogram of the chest was obtained following administration of   intravenous contrast. CT scan of the abdomen was also obtained.   Approximately 125-cc of Omnipaque 350 was administered and 25 cc was   discarded. Coronal, sagittal and MIP images were submitted for review.    No hilar and/or mediastinal adenopathy is noted.     Heart is normal in size. No pericardial effusion is noted. Pulmonary   arteries are normal in caliber. No filling defects are noted within the   main, right and left main, lobar and proximal segmental pulmonary artery   branches bilaterally. Evaluation of the mid to distal segmental and   subsegmental pulmonary artery branches is limited due to poor   opacification.     No endobronchial lesions are noted. 0.2 cm noncalcified nodule is noted   in the right upper lobe. Few tiny calcified granulomas are noted in the   left upper lobe. No pleural effusions are noted.    Liver, pancreas, gallbladder and both adrenal glands are unremarkable. A   2 cm low-attenuation lesion is noted in the spleen.     Both kidneys enhance with contrast. No hydronephrosis is noted.     No retroperitoneal adenopathy is noted.     Stool is noted throughout the large bowel. Appendix is visualized and is   unremarkable.     Examination of the pelvis demonstrate no free fluid.     Degenerative changes of the spine are noted. 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    Impression: No pulmonary embolus is noted within the main,right and left   main, lobar and proximal segmental pulmonary artery branches bilaterally.   Evaluation of the mid to distal segmental and subsegmental pulmonary   artery branches is limited.     2 cm low-attenuation lesion in the spleen is indeterminate based on this   exam. Further evalua    Rapid Respiratory Viral Panel (19 @ 12:17)    Rapid RVP Result: NotDetec: This Respiratory Panel uses polymerase chain reaction (PCR) to detect for  adenovirus; coronavirus (HKU1, NL63, 229E, OC43); human metapneumovirus  (hMPV); human enterovirus/rhinovirus (Entero/RV); influenza A; influenza  A/H1; influenza A/H3; influenza A/H1-2009; influenza B; parainfluenza  viruses 1, 2, 3, 4; respiratory syncytial virus; Mycoplasma pneumoniae;  and Chlamydophila pneumoniae.        Imaging Studies:Strep (Rapid) Group A.: Negative (19 @ 13:57)        Impression / Plan:
CC: f/u for fevers, fevers have resolved     Patient reports feeling better today     REVIEW OF SYSTEMS:  All other review of systems negative (Comprehensive ROS)      T(F): 98.5 (19 @ 04:40), Max: 98.7 (19 @ 21:14)  HR: 61 (19 @ 04:40)  BP: 133/81 (19 @ 04:40)  RR: 17 (19 @ 04:40)  SpO2: 99% (19 @ 04:40)  Wt(kg): --    PHYSICAL EXAM:  General: alert, no acute distress  Eyes:  anicteric, no conjunctival injection, no discharge  Oropharynx: no lesions or injection 	  Neck: supple, without adenopathy  Lungs: clear to auscultation  Heart: regular rate and rhythm; no murmur, rubs or gallops  Abdomen: soft, nondistended, nontender, without mass or organomegaly  Skin: no lesions  Extremities: no clubbing, cyanosis, or edema  Neurologic: alert, oriented, moves all extremities    LAB RESULTS:                        12.4   13.44 )-----------( 330      ( 2019 10:59 )             37.6     07-20    139  |  102  |  12  ----------------------------<  106<H>  3.7   |  22  |  0.91    Ca    8.7      2019 06:10  Phos  2.1       Mg     1.6         TPro  8.3  /  Alb  4.9  /  TBili  0.5  /  DBili  x   /  AST  21  /  ALT  27  /  AlkPhos  89      LIVER FUNCTIONS - ( 2019 11:50 )  Alb: 4.9 g/dL / Pro: 8.3 g/dL / ALK PHOS: 89 U/L / ALT: 27 U/L / AST: 21 U/L / GGT: x           Urinalysis Basic - ( 2019 13:45 )    Color: Colorless / Appearance: Clear / S.010 / pH: x  Gluc: x / Ketone: Negative  / Bili: Negative / Urobili: Negative   Blood: x / Protein: Negative / Nitrite: Negative   Leuk Esterase: Negative / RBC: 0 /hpf / WBC 0 /HPF   Sq Epi: x / Non Sq Epi: 0 /hpf / Bacteria: Negative      MICROBIOLOGY:  RECENT CULTURES:      RADIOLOGY REVIEWED:        Antimicrobials Day #    cefTRIAXone   IVPB 1000 milliGRAM(s) IV Intermittent every 24 hours  doxycycline hyclate Capsule 100 milliGRAM(s) Oral every 12 hours    Other Medications Reviewed
CC: f/u for fevers, fevers have resolved     Patient with no complaints he reports he is leaving home today     REVIEW OF SYSTEMS:  All other review of systems negative (Comprehensive ROS)    Vital Signs Last 24 Hrs  T(C): 36.8 (2019 05:02), Max: 37.1 (2019 21:19)  T(F): 98.2 (2019 05:02), Max: 98.7 (2019 21:19)  HR: 68 (2019 05:02) (67 - 80)  BP: 147/78 (2019 05:02) (147/78 - 159/85)  BP(mean): --  RR: 18 (2019 05:02) (17 - 18)  SpO2: 95% (2019 05:02) (95% - 97%)    PHYSICAL EXAM:  General: alert, no acute distress  Eyes:  anicteric, no conjunctival injection, no discharge  Oropharynx: no lesions or injection 	  Neck: supple, without adenopathy  Lungs: clear to auscultation  Heart: regular rate and rhythm; no murmur, rubs or gallops  Abdomen: soft, nondistended, nontender, without mass or organomegaly  Skin: no lesions  Extremities: no clubbing, cyanosis, or edema  Neurologic: alert, oriented, moves all extremities    LAB RESULTS:                        12.9   14.54 )-----------( 334      ( 2019 10:23 )             39.2                           12.4   13.44 )-----------( 330      ( 2019 10:59 )             37.6     07-20    139  |  102  |  12  ----------------------------<  106<H>  3.7   |  22  |  0.91    Ca    8.7      2019 06:10  Phos  2.1     07-  Mg     1.6     -19    TPro  8.3  /  Alb  4.9  /  TBili  0.5  /  DBili  x   /  AST  21  /  ALT  27  /  AlkPhos  89  07-19    LIVER FUNCTIONS - ( 2019 11:50 )  Alb: 4.9 g/dL / Pro: 8.3 g/dL / ALK PHOS: 89 U/L / ALT: 27 U/L / AST: 21 U/L / GGT: x           Urinalysis Basic - ( 2019 13:45 )    Color: Colorless / Appearance: Clear / S.010 / pH: x  Gluc: x / Ketone: Negative  / Bili: Negative / Urobili: Negative   Blood: x / Protein: Negative / Nitrite: Negative   Leuk Esterase: Negative / RBC: 0 /hpf / WBC 0 /HPF   Sq Epi: x / Non Sq Epi: 0 /hpf / Bacteria: Negative      MICROBIOLOGY:  RECENT CULTURES:      RADIOLOGY REVIEWED:        Antimicrobials Day #    cefTRIAXone   IVPB 1000 milliGRAM(s) IV Intermittent every 24 hours  doxycycline hyclate Capsule 100 milliGRAM(s) Oral every 12 hours    Other Medications Reviewed
Patient is a 58y old  Male who presents with a chief complaint of fever, weakness (2019 05:43)      SUBJECTIVE / OVERNIGHT EVENTS: Comfortable without new complaints. Wants to go home.  Review of Systems  chest pain no  palpitations no  sob no  nausea no  headache no    MEDICATIONS  (STANDING):  cefTRIAXone   IVPB 1000 milliGRAM(s) IV Intermittent every 24 hours  doxycycline hyclate Capsule 100 milliGRAM(s) Oral every 12 hours  heparin  Injectable 5000 Unit(s) SubCutaneous every 12 hours  losartan 50 milliGRAM(s) Oral daily    MEDICATIONS  (PRN):  acetaminophen   Tablet .. 650 milliGRAM(s) Oral every 6 hours PRN Temp greater or equal to 38.5C (101.3F), Mild Pain (1 - 3)      Vital Signs Last 24 Hrs  T(C): 36.8 (2019 05:02), Max: 37.1 (2019 21:19)  T(F): 98.2 (2019 05:02), Max: 98.7 (2019 21:19)  HR: 68 (2019 05:02) (67 - 80)  BP: 147/78 (2019 05:02) (147/78 - 159/85)  BP(mean): --  RR: 18 (2019 05:02) (17 - 18)  SpO2: 95% (2019 05:02) (95% - 97%)    PHYSICAL EXAM:  GENERAL: NAD, well-developed  HEAD:  Atraumatic, Normocephalic  EYES: EOMI, PERRLA, conjunctiva and sclera clear  NECK: Supple, No JVD  CHEST/LUNG: Clear to auscultation bilaterally; No wheeze  HEART: Regular rate and rhythm; No murmurs, rubs, or gallops  ABDOMEN: Soft, Nontender, Nondistended; Bowel sounds present  EXTREMITIES:  2+ Peripheral Pulses, No clubbing, cyanosis, or edema  PSYCH: AAOx3  NEUROLOGY: non-focal  SKIN: No rashes or lesions    LABS:                        12.9   14.54 )-----------( 334      ( 2019 10:23 )             39.2     07-20    139  |  102  |  12  ----------------------------<  106<H>  3.7   |  22  |  0.91    Ca    8.7      2019 06:10  Phos  2.1       Mg     1.6         TPro  8.3  /  Alb  4.9  /  TBili  0.5  /  DBili  x   /  AST  21  /  ALT  27  /  AlkPhos  89  -    PT/INR - ( 2019 11:50 )   PT: 12.8 sec;   INR: 1.12 ratio         PTT - ( 2019 11:50 )  PTT:30.7 sec      Urinalysis Basic - ( 2019 13:45 )    Color: Colorless / Appearance: Clear / S.010 / pH: x  Gluc: x / Ketone: Negative  / Bili: Negative / Urobili: Negative   Blood: x / Protein: Negative / Nitrite: Negative   Leuk Esterase: Negative / RBC: 0 /hpf / WBC 0 /HPF   Sq Epi: x / Non Sq Epi: 0 /hpf / Bacteria: Negative        Culture - Urine (collected 2019 18:37)  Source: .Urine  Final Report (2019 17:10):    No growth    Culture - Group A Streptococcus (collected 2019 18:15)  Source: .Throat  Final Report (2019 10:58):    No Streptococcus pyogenes (Group A) isolated    Culture - Blood (collected 2019 15:04)  Source: .Blood  Preliminary Report (2019 16:01):    No growth to date.    Culture - Blood (collected 2019 15:04)  Source: .Blood  Preliminary Report (2019 16:01):    No growth to date.        RADIOLOGY & ADDITIONAL TESTS:    Imaging Personally Reviewed:    Consultant(s) Notes Reviewed:      Care Discussed with Consultants/Other Providers:
Patient is a 58y old  Male who presents with a chief complaint of fever, weakness (2019 12:39)      SUBJECTIVE / OVERNIGHT EVENTS: Comfortable without new complaints.   Review of Systems  chest pain no  palpitations no  sob no  nausea no  headache no    MEDICATIONS  (STANDING):  cefTRIAXone   IVPB 1000 milliGRAM(s) IV Intermittent every 24 hours  doxycycline hyclate Capsule 100 milliGRAM(s) Oral every 12 hours  heparin  Injectable 5000 Unit(s) SubCutaneous every 12 hours  losartan 50 milliGRAM(s) Oral daily    MEDICATIONS  (PRN):  acetaminophen   Tablet .. 650 milliGRAM(s) Oral every 6 hours PRN Temp greater or equal to 38.5C (101.3F), Mild Pain (1 - 3)      Vital Signs Last 24 Hrs  T(C): 36.9 (2019 04:40), Max: 37.1 (2019 21:14)  T(F): 98.5 (2019 04:40), Max: 98.7 (2019 21:14)  HR: 61 (2019 04:40) (61 - 86)  BP: 133/81 (2019 04:40) (133/81 - 148/70)  BP(mean): --  RR: 17 (2019 04:40) (16 - 18)  SpO2: 99% (2019 04:40) (95% - 99%)    PHYSICAL EXAM:  GENERAL: NAD, well-developed  HEAD:  Atraumatic, Normocephalic  EYES: EOMI, PERRLA, conjunctiva and sclera clear  NECK: Supple, No JVD  CHEST/LUNG: Clear to auscultation bilaterally; No wheeze  HEART: Regular rate and rhythm; No murmurs, rubs, or gallops  ABDOMEN: Soft, Nontender, Nondistended; Bowel sounds present  EXTREMITIES:  2+ Peripheral Pulses, No clubbing, cyanosis, or edema  PSYCH: AAOx3  NEUROLOGY: non-focal  SKIN: No rashes or lesions    LABS:                        12.4   13.44 )-----------( 330      ( 2019 10:59 )             37.6     07-20    139  |  102  |  12  ----------------------------<  106<H>  3.7   |  22  |  0.91    Ca    8.7      2019 06:10  Phos  2.1     -  Mg     1.6         TPro  8.3  /  Alb  4.9  /  TBili  0.5  /  DBili  x   /  AST  21  /  ALT  27  /  AlkPhos  89  -    PT/INR - ( 2019 11:50 )   PT: 12.8 sec;   INR: 1.12 ratio         PTT - ( 2019 11:50 )  PTT:30.7 sec      Urinalysis Basic - ( 2019 13:45 )    Color: Colorless / Appearance: Clear / S.010 / pH: x  Gluc: x / Ketone: Negative  / Bili: Negative / Urobili: Negative   Blood: x / Protein: Negative / Nitrite: Negative   Leuk Esterase: Negative / RBC: 0 /hpf / WBC 0 /HPF   Sq Epi: x / Non Sq Epi: 0 /hpf / Bacteria: Negative          RADIOLOGY & ADDITIONAL TESTS:    Imaging Personally Reviewed:    Consultant(s) Notes Reviewed:      Care Discussed with Consultants/Other Providers:
Subjective: Patient is a 58y old  Male who presents with a chief complaint of  fever cough anorexia weight loss 2 weeks, has hx of leukocytosis for several years work up heme negative seen in er 2 weeks ago ct abdomen chest negativen amoxacillin no relief seen by me  no improvement hospitalized doing much better afebrile now 48 hours    PAST MEDICAL & SURGICAL HISTORY:  HTN (hypertension)      Allergies    No Known Allergies    Intolerances        MEDICATIONS  (STANDING):  cefTRIAXone   IVPB 1000 milliGRAM(s) IV Intermittent every 24 hours  doxycycline hyclate Capsule 100 milliGRAM(s) Oral every 12 hours    MEDICATIONS  (PRN):      CONSTITUTIONALfatigue temps 10lb weight lss  EYES: No eye pain, visual disturbances, or discharge  ENMT:  No difficulty hearing, tinnitus, vertigo; No sinus or throat pain  NECK: No pain or stiffness  BREASTS: No pain, masses, or nipple discharge  RESPIRATORY: No cough, wheezing, chills or hemoptysis; No shortness of breath  CARDIOVASCULAR: No chest pain, palpitations, dizziness, or leg swelling  GASTROINTESTINAL: No abdominal or epigastric pain. No vomiting, or hematemesis; No diarrhea or constipation. No melena or hematochezia. anorexic  some mausea  GENITOURINARY: No dysuria, frequency, hematuria, or incontinence  NEUROLOGICAL: No headaches, memory loss, loss of strength, numbness, or tremors  SKIN: No itching, burning, rashes, or lesions   LYMPH NODES: No enlarged glands  ENDOCRINE: No heat or cold intolerance; No hair loss  MUSCULOSKELETAL: No joint pain or swelling; No muscle, back, or extremity pain  PSYCHIATRIC: No depression, anxiety, mood swings, or difficulty sleeping  HEME/LYMPH: No easy bruising, or bleeding gums  ALLERY AND IMMUNOLOGIC: No hives or eczema      PHYSICAL EXAM:   tem p 98 bp 112/70  GENERAL: Comfortable, no acute distress   HEAD:  Normocephalic, atraumatic  EYES: EOMI, PERRLA  HEENT: Moist mucous membranes  NECK: Supple, No JVD  NERVOUS SYSTEM:  Alert & Oriented X3, Motor Strength 5/5 B/L upper and lower extremities  CHEST/LUNG: Clear to auscultation bilaterally  HEART: Regular rate and rhythm  ABDOMEN: Soft, Nontender, Nondistended, Bowel sounds present  GENITOURINARY: Voiding, no palpable bladder  EXTREMITIES:   No clubbing, cyanosis, or edema  MUSCULOSKELTAL- No muscle tenderness, no joint tenderness  SKIN-no rash            V    LABS:Complete Blood Count in AM (19 @ 10:59)    WBC Count: 13.44 K/uL    RBC Count: 4.32 M/uL    Hemoglobin: 12.4 g/dL    Hematocrit: 37.6 %    Mean Cell Volume: 87.0 fl    Mean Cell Hemoglobin: 28.7 pg    Mean Cell Hemoglobin Conc: 33.0 gm/dL    Red Cell Distrib Width: 13.6 %    Platelet Count - Automated: 330 K/uL    Basic Metabolic Panel in AM (19 @ 06:10)    Sodium, Serum: 139 mmol/L    Potassium, Serum: 3.7 mmol/L    Chloride, Serum: 102 mmol/L    Carbon Dioxide, Serum: 22 mmol/L    Anion Gap, Serum: 15 mmol/L    Blood Urea Nitrogen, Serum: 12 mg/dL    Creatinine, Serum: 0.91 mg/dL    Glucose, Serum: 106 mg/dL    Calcium, Total Serum: 8.7 mg/dL    eGFR if Non : 92: Interpretative comment  The units for eGFR are mL/min/1.73M2 (normalized body surface area). The  eGFR is calculated from a serum creatinine using the CKD-EPI equation.  Other variables required for calculation are race, age and sex. Among  patients with chronic kidney disease (CKD), the eGFR is useful in  determining the stage of disease according to KDOQI CKD classification.  All eGFR results are reported numerically with the following  interpretation.          GFR                    With                 Without     (ml/min/1.73 m2)    Kidney Damage       Kidney Damage        >= 90                    Stage 1                     Normal        60-89                    Stage 2                     Decreased GFR        30-59     Stage 3                     Stage 3        15-29                    Stage 4                     Stage 4        < 15                      Stage 5                     Stage 5  Each stage of CKD assumes that the associated GFR level has been in  effect for at least 3 months. Determination of stages one and two (with  eGFR > 59 ml/min/m2) requires estimation of kidney damage for at least 3  months as defined by structural or functional abnormalities.  Limitations: All estimates of GFR will be less accurate for patients at  extremes of muscle mass (including but not limited to frail elderly,  critically ill, or cancer patients), those with unusual diets, and those  with conditions associated with reduced secretion or extrarenal  elimination of creatinine. The eGFR equation is not recommended for use  in patients with unstable creatinine levels. mL/min/1.73M2    eGFR if African American: 107 mL/min/1.73M2          Urinalysis Basic - ( 2019 13:45 )    Color: Colorless / Appearance: Clear / S.010 / pH: x  Gluc: x / Ketone: Negative  / Bili: Negative / Urobili: Negative   Blood: x / Protein: Negative / Nitrite: Negative   Leuk Esterase: Negative / RBC: 0 /hpf / WBC 0 /HPF   Sq Epi: x / Non Sq Epi: 0 /hpf / Bacteria: Negative      LIVER FUNCTIONS - ( 2019 11:50 )  Alb: 4.9 g/dL / Pro: 8.3 g/dL / ALK PHOS: 89 U/L / ALT: 27 U/L / AST: 21 U/L / GGT: x                 < from: CT Abdomen and Pelvis w/ IV Cont (19 @ 13:29) >  EXAM:  CT ABDOMEN AND PELVIS IC                          EXAM:  CT ANGIO CHEST (W)AW IC                            PROCEDURE DATE:  2019            INTERPRETATION:  Clinical information: Fever. Shortness of breath.   Evaluate for pulmonary embolus.    CT angiogram of the chest was obtained following administration of   intravenous contrast. CT scan of the abdomen was also obtained.   Approximately 125-cc of Omnipaque 350 was administered and 25 cc was   discarded. Coronal, sagittal and MIP images were submitted for review.    No hilar and/or mediastinal adenopathy is noted.     Heart is normal in size. No pericardial effusion is noted. Pulmonary   arteries are normal in caliber. No filling defects are noted within the   main, right and left main, lobar and proximal segmental pulmonary artery   branches bilaterally. Evaluation of the mid to distal segmental and   subsegmental pulmonary artery branches is limited due to poor   opacification.     No endobronchial lesions are noted. 0.2 cm noncalcified nodule is noted   in the right upper lobe. Few tiny calcified granulomas are noted in the   left upper lobe. No pleural effusions are noted.    Liver, pancreas, gallbladder and both adrenal glands are unremarkable. A   2 cm low-attenuation lesion is noted in the spleen.     Both kidneys enhance with contrast. No hydronephrosis is noted.     No retroperitoneal adenopathy is noted.     Stool is noted throughout the large bowel. Appendix is visualized and is   unremarkable.     Examination of the pelvis demonstrate no free fluid.     Degenerative changes of the spine are noted. 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    Impression: No pulmonary embolus is noted within the main,right and left   main, lobar and proximal segmental pulmonary artery branches bilaterally.   Evaluation of the mid to distal segmental and subsegmental pulmonary   artery branches is limited.     2 cm low-attenuation lesion in the spleen is indeterminate based on this   exam. Further evalua    Rapid Respiratory Viral Panel (19 @ 12:17)    Rapid RVP Result: NotDetec: This Respiratory Panel uses polymerase chain reaction (PCR) to detect for  adenovirus; coronavirus (HKU1, NL63, 229E, OC43); human metapneumovirus  (hMPV); human enterovirus/rhinovirus (Entero/RV); influenza A; influenza  A/H1; influenza A/H3; influenza A/H1-2009; influenza B; parainfluenza  viruses 1, 2, 3, 4; respiratory syncytial virus; Mycoplasma pneumoniae;  and Chlamydophila pneumoniae.        Imaging Studies:Strep (Rapid) Group A.: Negative (19 @ 13:57)        Impression / Plan:
Subjective: Patient is a 58y old  Male who presents with a chief complaint of  fever cough anorexia weight loss 2 weeks, has hx of leukocytosis for several years work up heme negative seen in er 2 wweeks ago ct abdomen chest negativen amoxacillin no relief seen by me 4 8 hours ago no imrocement hospitalize    PAST MEDICAL & SURGICAL HISTORY:  HTN (hypertension)      Allergies    No Known Allergies    Intolerances        MEDICATIONS  (STANDING):  cefTRIAXone   IVPB 1000 milliGRAM(s) IV Intermittent every 24 hours  doxycycline hyclate Capsule 100 milliGRAM(s) Oral every 12 hours    MEDICATIONS  (PRN):      CONSTITUTIONALfatigue temps 100 10lb weight lss  EYES: No eye pain, visual disturbances, or discharge  ENMT:  No difficulty hearing, tinnitus, vertigo; No sinus or throat pain  NECK: No pain or stiffness  BREASTS: No pain, masses, or nipple discharge  RESPIRATORY: No cough, wheezing, chills or hemoptysis; No shortness of breath  CARDIOVASCULAR: No chest pain, palpitations, dizziness, or leg swelling  GASTROINTESTINAL: No abdominal or epigastric pain. No vomiting, or hematemesis; No diarrhea or constipation. No melena or hematochezia. anorexic  some mausea  GENITOURINARY: No dysuria, frequency, hematuria, or incontinence  NEUROLOGICAL: No headaches, memory loss, loss of strength, numbness, or tremors  SKIN: No itching, burning, rashes, or lesions   LYMPH NODES: No enlarged glands  ENDOCRINE: No heat or cold intolerance; No hair loss  MUSCULOSKELETAL: No joint pain or swelling; No muscle, back, or extremity pain  PSYCHIATRIC: No depression, anxiety, mood swings, or difficulty sleeping  HEME/LYMPH: No easy bruising, or bleeding gums  ALLERY AND IMMUNOLOGIC: No hives or eczema      PHYSICAL EXAM:    GENERAL: Comfortable, no acute distress   HEAD:  Normocephalic, atraumatic  EYES: EOMI, PERRLA  HEENT: Moist mucous membranes  NECK: Supple, No JVD  NERVOUS SYSTEM:  Alert & Oriented X3, Motor Strength 5/5 B/L upper and lower extremities  CHEST/LUNG: Clear to auscultation bilaterally  HEART: Regular rate and rhythm  ABDOMEN: Soft, Nontender, Nondistended, Bowel sounds present  GENITOURINARY: Voiding, no palpable bladder  EXTREMITIES:   No clubbing, cyanosis, or edema  MUSCULOSKELTAL- No muscle tenderness, no joint tenderness  SKIN-no rash            Vital Signs Last 24 Hrs  T(C): 36.9 (2019 14:45), Max: 38.2 (2019 11:51)  T(F): 98.5 (2019 14:45), Max: 100.7 (2019 11:51)  HR: 89 (2019 14:45) (81 - 116)  BP: 169/79 (2019 14:45) (146/75 - 184/110)  BP(mean): --  RR: 18 (2019 14:45) (16 - 22)  SpO2: 99% (2019 14:45) (98% - 100%)      LABS:        136  |  94<L>  |  12  ----------------------------<  159<H>  3.6   |  23  |  0.87    Ca    10.8<H>      2019 11:50  Phos  2.1       Mg     1.6         TPro  8.3  /  Alb  4.9  /  TBili  0.5  /  DBili  x   /  AST  21  /  ALT  27  /  AlkPhos  89      CBC Full  -  ( 2019 11:50 )  WBC Count : 16.6 K/uL  RBC Count : 5.08 M/uL  Hemoglobin : 15.3 g/dL  Hematocrit : 44.8 %  Platelet Count - Automated : 404 K/uL  Mean Cell Volume : 88.1 fl  Mean Cell Hemoglobin : 30.1 pg  Mean Cell Hemoglobin Concentration : 34.1 gm/dL  Auto Neutrophil # : 11.1 K/uL  Auto Lymphocyte # : 4.2 K/uL  Auto Monocyte # : 1.1 K/uL  Auto Eosinophil # : 0.0 K/uL  Auto Basophil # : 0.1 K/uL  Auto Neutrophil % : 66.7 %  Auto Lymphocyte % : 25.7 %  Auto Monocyte % : 6.9 %  Auto Eosinophil % : 0.1 %  Auto Basophil % : 0.7 %    Urinalysis Basic - ( 2019 13:45 )    Color: Colorless / Appearance: Clear / S.010 / pH: x  Gluc: x / Ketone: Negative  / Bili: Negative / Urobili: Negative   Blood: x / Protein: Negative / Nitrite: Negative   Leuk Esterase: Negative / RBC: 0 /hpf / WBC 0 /HPF   Sq Epi: x / Non Sq Epi: 0 /hpf / Bacteria: Negative      LIVER FUNCTIONS - ( 2019 11:50 )  Alb: 4.9 g/dL / Pro: 8.3 g/dL / ALK PHOS: 89 U/L / ALT: 27 U/L / AST: 21 U/L / GGT: x                 < from: CT Abdomen and Pelvis w/ IV Cont (19 @ 13:29) >  EXAM:  CT ABDOMEN AND PELVIS IC                          EXAM:  CT ANGIO CHEST (W)AW IC                            PROCEDURE DATE:  2019            INTERPRETATION:  Clinical information: Fever. Shortness of breath.   Evaluate for pulmonary embolus.    CT angiogram of the chest was obtained following administration of   intravenous contrast. CT scan of the abdomen was also obtained.   Approximately 125-cc of Omnipaque 350 was administered and 25 cc was   discarded. Coronal, sagittal and MIP images were submitted for review.    No hilar and/or mediastinal adenopathy is noted.     Heart is normal in size. No pericardial effusion is noted. Pulmonary   arteries are normal in caliber. No filling defects are noted within the   main, right and left main, lobar and proximal segmental pulmonary artery   branches bilaterally. Evaluation of the mid to distal segmental and   subsegmental pulmonary artery branches is limited due to poor   opacification.     No endobronchial lesions are noted. 0.2 cm noncalcified nodule is noted   in the right upper lobe. Few tiny calcified granulomas are noted in the   left upper lobe. No pleural effusions are noted.    Liver, pancreas, gallbladder and both adrenal glands are unremarkable. A   2 cm low-attenuation lesion is noted in the spleen.     Both kidneys enhance with contrast. No hydronephrosis is noted.     No retroperitoneal adenopathy is noted.     Stool is noted throughout the large bowel. Appendix is visualized and is   unremarkable.     Examination of the pelvis demonstrate no free fluid.     Degenerative changes of the spine are noted. 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    Impression: No pulmonary embolus is noted within the main,right and left   main, lobar and proximal segmental pulmonary artery branches bilaterally.   Evaluation of the mid to distal segmental and subsegmental pulmonary   artery branches is limited.     2 cm low-attenuation lesion in the spleen is indeterminate based on this   exam. Further evalua    Rapid Respiratory Viral Panel (19 @ 12:17)    Rapid RVP Result: NotDetec: This Respiratory Panel uses polymerase chain reaction (PCR) to detect for  adenovirus; coronavirus (HKU1, NL63, 229E, OC43); human metapneumovirus  (hMPV); human enterovirus/rhinovirus (Entero/RV); influenza A; influenza  A/H1; influenza A/H3; influenza A/H1-2009; influenza B; parainfluenza  viruses 1, 2, 3, 4; respiratory syncytial virus; Mycoplasma pneumoniae;  and Chlamydophila pneumoniae.        Imaging Studies:Strep (Rapid) Group A.: Negative (19 @ 13:57)        Impression / Plan:

## 2019-07-21 NOTE — DISCHARGE NOTE PROVIDER - CARE PROVIDER_API CALL
Panchito Cramer (MD)  Gastroenterology; Internal Medicine  [address], [address]  [place] [address]  Phone: [phone]  Fax: [phone]  Follow Up Time:

## 2019-07-21 NOTE — DISCHARGE NOTE NURSING/CASE MANAGEMENT/SOCIAL WORK - NSDCDPATPORTLINK_GEN_ALL_CORE
You can access the [facility] Patient Portal, offered by [facility], by registering with the following website: [email or web address]

## 2019-07-21 NOTE — PROGRESS NOTE ADULT - REASON FOR ADMISSION
fever, weakness
fever, weakness weight loss
cough fever weight loss anorexia for 2 weeks
fever, weakness

## 2019-07-24 LAB
CULTURE RESULTS: SIGNIFICANT CHANGE UP
CULTURE RESULTS: SIGNIFICANT CHANGE UP
SPECIMEN SOURCE: SIGNIFICANT CHANGE UP
SPECIMEN SOURCE: SIGNIFICANT CHANGE UP

## 2021-01-17 ENCOUNTER — TRANSCRIPTION ENCOUNTER (OUTPATIENT)
Age: 61
End: 2021-01-17

## 2021-11-11 NOTE — PATIENT PROFILE ADULT - JOB HELP
Phani was seen today for RLQ abdominal pain. An iliohypogastric nerve block was ordered, if limited benefit will discuss medication options.    Thanks for this referral,    Ruben Butt DO  [facility]       no

## 2022-10-18 NOTE — ED ADULT NURSE NOTE - NS ED NURSE DISCH DISPOSITION
----- Message from Samantha Dorman MD sent at 10/18/2022  8:02 AM CDT -----  Regarding: esophageal motility  Please arrange for esophageal motility study for this patient     Admitted

## 2024-04-26 PROBLEM — I10 ESSENTIAL (PRIMARY) HYPERTENSION: Chronic | Status: ACTIVE | Noted: 2019-07-19

## 2024-05-14 DIAGNOSIS — M25.561 PAIN IN RIGHT KNEE: ICD-10-CM

## 2024-05-15 ENCOUNTER — NON-APPOINTMENT (OUTPATIENT)
Age: 64
End: 2024-05-15

## 2024-05-15 ENCOUNTER — APPOINTMENT (OUTPATIENT)
Dept: ORTHOPEDIC SURGERY | Facility: CLINIC | Age: 64
End: 2024-05-15
Payer: COMMERCIAL

## 2024-05-15 VITALS — BODY MASS INDEX: 40.98 KG/M2 | WEIGHT: 255 LBS | HEIGHT: 66 IN

## 2024-05-15 DIAGNOSIS — Z96.652 PRESENCE OF LEFT ARTIFICIAL KNEE JOINT: ICD-10-CM

## 2024-05-15 PROCEDURE — 73564 X-RAY EXAM KNEE 4 OR MORE: CPT | Mod: RT

## 2024-05-15 PROCEDURE — 99204 OFFICE O/P NEW MOD 45 MIN: CPT | Mod: 25

## 2024-05-15 PROCEDURE — 73562 X-RAY EXAM OF KNEE 3: CPT | Mod: LT

## 2024-05-15 PROCEDURE — 20610 DRAIN/INJ JOINT/BURSA W/O US: CPT | Mod: RT

## 2024-05-15 NOTE — PHYSICAL EXAM
[de-identified] : General appearance: well-nourished and hydrated, pleasant, alert and oriented x 3, cooperative. HEENT: Normocephalic, EOM intact, Nasal septum midline, Oral cavity clear, External auditory canal clear. Cardiovascular: B/L lower leg edema Lymphatics Lymph nodes: none palpated, Lymphedema: not present. Neurologic: sensation is normal, no muscle weakness in upper or lower extremities, patella tendon reflexes intact. Dermatologic no apparent skin lesions, moist, warm, no rash. Spine: cervical spine appears normal and moves freely, thoracic spine appears normal and moves freely, lumbosacral spine appears normal and moves freely. Gait: nonantalgic.   Right knee Inspection: scar on medial aspect f knee Wounds: none. Alignment: normal. Palpation: no specific tenderness on palpation. ROM active (in degrees): 0-120 with crepitus and pain at extremes with flexion and extension Ligamentous laxity: all ligaments appear stable,, negative ant. drawer test, negative post. drawer test, stable to varus stress test, stable to valgus stress test. negative Lachman's test, negative pivot shift test Meniscal Test: negative McMurrays, negative Bean. Patellofemoral Alignment Test: Q angle-, normal. Muscle Test: good quad strength. Leg examination: calf is soft and non-tender.   Left knee Inspection: no effusion or erythema. Wounds: well healed midline incision. Alignment: normal. Palpation: no specific tenderness on palpation. ROM active (in degrees): 0-125 Ligamentous laxity: all ligaments appear stable, negative ant. drawer test, negative post. drawer test, stable to varus stress test, stable to valgus stress test. negative Lachman's test, negative pivot shift test Meniscal Test: negative McMurrays, negative Bean. Patellofemoral Alignment Test: Q angle-, normal. Muscle Test: good quad strength.   Left hip Inspection: No swelling or ecchymosis. Wounds: none. Palpation: non-tender. Stability: no instability. Strength: 5/5 all motor groups. ROM: no pain with FROM. Leg length: equal.   Right hip Inspection: No swelling or ecchymosis. Wounds: none. Palpation: non-tender. Stability: no instability. Strength: 5/5 all motor groups. ROM: no pain with FROM. Leg length: equal.   Left ankle Inspection: no erythema noted, no swelling noted. Palpation: no pain on palpation. ROM: FROM without crepitus. Muscle strength: 5/5. Stability: no instability noted.   Right ankle Inspection: no erythema noted, no swelling noted. ROM: FROM without crepitus. Palpation: no pain on palpation. Muscle strength: 5/5. Stability: no instability noted.   Left foot Inspection: mild pes planus ROM: full range of motion of all joints without pain or crepitus. Palpation: no tenderness. Stability: no instability noted.   Right foot Inspection: color, texture and turgor are normal. ROM: full range of motion of all joints without pain or crepitus. Palpation: no tenderness. Stability: no instability noted.   Left shoulder Inspection: no muscle asymmetry, no atrophy. Palpation: no tenderness noted, ACJ non-tender. ROM: full active ROM, full passive ROM. Strength testing): anterior deltoid, supraspinatus, infraspinatus, subscapularis all 5/5. Stability test: ant. apprehension negative, post. apprehension negative, relocation test negative. Impingement Test: negative NEER.   Right shoulder Inspection: no muscle asymmetry, no atrophy. Palpation: no tenderness noted, ACJ non-tender. ROM: full active ROM, full passive ROM. Strength testing): anterior deltoid, supraspinatus, infraspinatus, subscapularis all 5/5. Stability test: ant. apprehension negative, post. apprehension negative, relocation test negative. Impingement Test: positive NEER. Surgical Wounds: none.   Left elbow Inspection: negative swelling. Wounds: none. Palpation: non-tender. ROM: full ROM. Strength: 5/5 all groups. Stability: no instability. Mass: none.   Right elbow Inspection: negative swelling. Wounds: none. Palpation: non-tender. ROM: full ROM. Strength: 5/5 all groups. Stability: no instability. Mass: none.   Left wrist Inspection: negative swelling. Wound: none. Palpation (bone): no tenderness. ROM: full ROM. Strength: full , good.   Right wrist Inspection: negative swelling. Wound: none. Palpation (bone): no tenderness. ROM: full ROM. Strength: full , good.   Left hand Inspection: no skin changes, normal appearance. Wounds: none. Strength: full , able to make full fist. Sensation: light touch intact all fingers and thumb. Vascular: good capillary refill < 3 seconds, all fingers and thumb. Mass: none.   Right hand Inspection: no skin changes, normal appearance. Wounds: none. Strength: full , able to make full fist. Sensation: light touch intact all fingers and thumb. Vascular: good capillary refill < 3 seconds, all fingers and thumb. Mass: none. [de-identified] : Left knee x-ray, AP, lateral, Merchant view taken at the office today demonstrates a total knee replacement in satisfactory position and alignment. No evidence of loosening. The patella sits in a central position.  Right knee x-rays, standing AP/Lateral and Merchant films, and 45-degree PA standing view, taken at the office today shows diffuse tricompartmental degenerative arthritis, marginal osteophytes, medial bone on bone sclerosis, patella at appropriate height and central position, patellofemoral joint space narrowing, peripheral osteophytes, Kellgren and Sean grade 4.

## 2024-05-15 NOTE — DISCUSSION/SUMMARY
[de-identified] : Discussed at length the nature of the patient's condition. Their right knee symptoms appear secondary to degenerative arthritis. We reviewed operative and nonoperative treatment. While I believe he would eventually benefit from a TKR, he is hesitant to have surgery at this time. Therefore, we will continue nonoperatively. We will seek authorization for right knee viscosupplementation injections. Once we receive authorization, we will proceed accordingly. In the interim, patient was given a right knee cortisone injection today as detailed above for symptomatic relief. I also suggested home exercise, PT, weight loss, and Tylenol prn. They can continue activities as tolerated.  Patient's Left TKR is doing well. I reviewed x-rays with them and compared to prior films.   I will see them back for injections.

## 2024-05-15 NOTE — PROCEDURE
[de-identified] : RIGHT KNEE CORTISONE INJECTION Discussed at length with the patient the planned steroid and lidocaine injection. The risks, benefits, convalescence and alternatives were reviewed. The possible side effects discussed included but were not limited to: pain, swelling, heat and redness. These symptoms are generally mild but if they are extensive then contact the office. Giving pain relievers by mouth such as NSAIDs or Tylenol can generally treat the reactions to steroid and lidocaine. Rare cases of infection have been noted. Rash, hives and itching may occur post injection. If you have muscle pain or cramps, flushing and or swelling of the face, rapid heart beat, nausea, dizziness, fever, chills, headache, difficulty breathing, swelling in the arms or legs, or have a prickly feeling of your skin, contact a health care provider immediately.  Following this discussion, the knee was prepped with betadine and under sterile conditions 5 cc of 2% lidocaine and 1 cc depo-medrol (40mg) were injected with a 21 gauge needle. The needle was introduced into the joint, aspiration was performed to ensure intra-articular placement and the medication was injected. Upon withdrawal of the needle the site was cleaned with alcohol and a bandaid applied. The patient tolerated the injection well and there were no adverse effects. Post injection instructions included no strenuous activity for 24 hours, cryotherapy and if there are any adverse effects to contact the office.

## 2024-05-15 NOTE — HISTORY OF PRESENT ILLNESS
[de-identified] : EVA DAVIS, 63 year old male, presents for initial evaluation of right knee pain onset 2 months ago. The pain started severe and was waking him up at night, aggravated when walking, but felt better in the past 10 days. He had a left total knee replacement in 2009 by Dr. Urrutia. Patient states that he gained 15 lbs. in the past months since his longterm with no prior injury in right knee. The pain is diffuse and dull has no swelling or clicking but has buckling, locking, and loss of motion. His walking distance is very limited due to the pain, but he uses no walking aid and finds that stairs are difficult. He is taking Tylenol occasionally for the pain. He has MHx of HTN, and benign prostate hypertrophy with no known allergies.

## 2024-05-15 NOTE — ADDENDUM
[FreeTextEntry1] : This note was written by Atif Hendricks on 05/15/2024 acting as a scribe for Dr. Mart JONES I, Dr. Mart Urrutia, have read and attest that all the information, medical decision making and discharge instructions within are true and accurate.  This note was written by Joie Villanueva on 05/15/2024 acting as scribe for Dr. Mart JONES I, Dr. Mart Urrutia, have read and attest that all the information, medical decision making and discharge instructions within are true and accurate.

## 2024-06-05 ENCOUNTER — APPOINTMENT (OUTPATIENT)
Dept: UROLOGY | Facility: CLINIC | Age: 64
End: 2024-06-05
Payer: COMMERCIAL

## 2024-06-05 VITALS
WEIGHT: 250 LBS | HEIGHT: 66 IN | SYSTOLIC BLOOD PRESSURE: 164 MMHG | HEART RATE: 99 BPM | DIASTOLIC BLOOD PRESSURE: 91 MMHG | BODY MASS INDEX: 40.18 KG/M2

## 2024-06-05 DIAGNOSIS — N40.1 BENIGN PROSTATIC HYPERPLASIA WITH LOWER URINARY TRACT SYMPMS: ICD-10-CM

## 2024-06-05 DIAGNOSIS — N13.8 BENIGN PROSTATIC HYPERPLASIA WITH LOWER URINARY TRACT SYMPMS: ICD-10-CM

## 2024-06-05 DIAGNOSIS — R35.0 FREQUENCY OF MICTURITION: ICD-10-CM

## 2024-06-05 PROCEDURE — 99204 OFFICE O/P NEW MOD 45 MIN: CPT

## 2024-06-05 PROCEDURE — G2211 COMPLEX E/M VISIT ADD ON: CPT | Mod: NC,1L

## 2024-06-06 LAB
APPEARANCE: CLEAR
BACTERIA: NEGATIVE /HPF
BILIRUBIN URINE: NEGATIVE
BLOOD URINE: NEGATIVE
CAST: 0 /LPF
COLOR: YELLOW
EPITHELIAL CELLS: 2 /HPF
GLUCOSE QUALITATIVE U: NEGATIVE MG/DL
KETONES URINE: NEGATIVE MG/DL
LEUKOCYTE ESTERASE URINE: NEGATIVE
MICROSCOPIC-UA: NORMAL
NITRITE URINE: NEGATIVE
PH URINE: 6.5
PROTEIN URINE: NEGATIVE MG/DL
PSA FREE FLD-MCNC: 14 %
PSA FREE SERPL-MCNC: 0.17 NG/ML
PSA SERPL-MCNC: 1.18 NG/ML
RED BLOOD CELLS URINE: 2 /HPF
SPECIFIC GRAVITY URINE: 1.02
UROBILINOGEN URINE: 0.2 MG/DL
WHITE BLOOD CELLS URINE: 0 /HPF

## 2024-06-07 LAB — BACTERIA UR CULT: NORMAL

## 2024-06-10 ENCOUNTER — APPOINTMENT (OUTPATIENT)
Dept: ORTHOPEDIC SURGERY | Facility: CLINIC | Age: 64
End: 2024-06-10
Payer: COMMERCIAL

## 2024-06-10 PROCEDURE — 20610 DRAIN/INJ JOINT/BURSA W/O US: CPT | Mod: RT

## 2024-06-10 NOTE — PROCEDURE
[de-identified] : Supartz #1 Right knee  Discussed at length with the patient the planned Supartz injection. The risks, benefits, convalescence and alternatives were reviewed. The possible side effects discussed included but were not limited to: pain, swelling, heat and redness. There symptoms are generally mild but if they are extensive then contact the office. Giving pain relievers by mouth such as NSAIDs or Tylenol can generally treat the reactions to Supartz. Rare cases of infection have been noted. Rash, hives and itching may occur post injection. If you have muscle pain or cramps, flushing and or swelling of the face, rapid heart beat, nausea, dizziness, fever, chills, headache, difficulty breathing, swelling in the arms or legs, or have a prickly feeling of your skin, contact a health care provider immediately.  Following this discussion, the knee was prepped with betadine and under sterile condition the Supartz injection was performed with a 22 gauge needle. The needle was introduced into the joint, aspiration was performed to ensure intra-articular placement and the medication was injected. Upon withdrawal of the needle the site was cleaned with alcohol and a bandaid applied. The patient tolerated the injection well and there were no adverse effects. Post injection instructions included no strenuous activity for 24 hours, cryotherapy and if there are any adverse effects to contact the office.

## 2024-06-11 LAB — URINE CYTOLOGY: NORMAL

## 2024-06-17 ENCOUNTER — APPOINTMENT (OUTPATIENT)
Dept: ORTHOPEDIC SURGERY | Facility: CLINIC | Age: 64
End: 2024-06-17
Payer: COMMERCIAL

## 2024-06-17 PROCEDURE — 20610 DRAIN/INJ JOINT/BURSA W/O US: CPT | Mod: RT

## 2024-06-17 NOTE — PROCEDURE
[de-identified] : Supartz #2 Right knee Discussed at length with the patient the planned Supartz injection. The risks, benefits, convalescence and alternatives were reviewed. The possible side effects discussed included but were not limited to: pain, swelling, heat and redness. There symptoms are generally mild but if they are extensive then contact the office. Giving pain relievers by mouth such as NSAIDs or Tylenol can generally treat the reactions to Supartz. Rare cases of infection have been noted. Rash, hives and itching may occur post injection. If you have muscle pain or cramps, flushing and or swelling of the face, rapid heart beat, nausea, dizziness, fever, chills, headache, difficulty breathing, swelling in the arms or legs, or have a prickly feeling of your skin, contact a health care provider immediately.  Following this discussion, the knee was prepped with betadine and under sterile condition the Supartz injection was performed with a 22 gauge needle. The needle was introduced into the joint, aspiration was performed to ensure intra-articular placement and the medication was injected. Upon withdrawal of the needle the site was cleaned with alcohol and a bandaid applied. The patient tolerated the injection well and there were no adverse effects. Post injection instructions included no strenuous activity for 24 hours, cryotherapy and if there are any adverse effects to contact the office.

## 2024-06-24 ENCOUNTER — APPOINTMENT (OUTPATIENT)
Dept: ORTHOPEDIC SURGERY | Facility: CLINIC | Age: 64
End: 2024-06-24
Payer: COMMERCIAL

## 2024-06-24 DIAGNOSIS — M17.0 BILATERAL PRIMARY OSTEOARTHRITIS OF KNEE: ICD-10-CM

## 2024-06-24 PROCEDURE — 20610 DRAIN/INJ JOINT/BURSA W/O US: CPT | Mod: RT

## 2024-10-18 ENCOUNTER — APPOINTMENT (OUTPATIENT)
Dept: ORTHOPEDIC SURGERY | Facility: CLINIC | Age: 64
End: 2024-10-18
Payer: MEDICARE

## 2024-10-18 VITALS — BODY MASS INDEX: 37.77 KG/M2 | HEIGHT: 66 IN | WEIGHT: 235 LBS

## 2024-10-18 DIAGNOSIS — S49.92XA UNSPECIFIED INJURY OF LEFT SHOULDER AND UPPER ARM, INITIAL ENCOUNTER: ICD-10-CM

## 2024-10-18 PROCEDURE — 73030 X-RAY EXAM OF SHOULDER: CPT | Mod: LT

## 2024-10-18 PROCEDURE — 99203 OFFICE O/P NEW LOW 30 MIN: CPT

## 2024-10-26 ENCOUNTER — OUTPATIENT (OUTPATIENT)
Dept: OUTPATIENT SERVICES | Facility: HOSPITAL | Age: 64
LOS: 1 days | End: 2024-10-26
Payer: MEDICARE

## 2024-10-26 ENCOUNTER — APPOINTMENT (OUTPATIENT)
Dept: MRI IMAGING | Facility: CLINIC | Age: 64
End: 2024-10-26

## 2024-10-26 DIAGNOSIS — S49.92XA UNSPECIFIED INJURY OF LEFT SHOULDER AND UPPER ARM, INITIAL ENCOUNTER: ICD-10-CM

## 2024-10-26 PROCEDURE — 73221 MRI JOINT UPR EXTREM W/O DYE: CPT | Mod: 26,LT,MH

## 2024-10-31 ENCOUNTER — NON-APPOINTMENT (OUTPATIENT)
Age: 64
End: 2024-10-31

## 2024-11-06 ENCOUNTER — APPOINTMENT (OUTPATIENT)
Dept: ORTHOPEDIC SURGERY | Facility: CLINIC | Age: 64
End: 2024-11-06
Payer: MEDICARE

## 2024-11-06 ENCOUNTER — NON-APPOINTMENT (OUTPATIENT)
Age: 64
End: 2024-11-06

## 2024-11-06 VITALS — HEIGHT: 66 IN | WEIGHT: 245 LBS | BODY MASS INDEX: 39.37 KG/M2

## 2024-11-06 DIAGNOSIS — S49.92XA UNSPECIFIED INJURY OF LEFT SHOULDER AND UPPER ARM, INITIAL ENCOUNTER: ICD-10-CM

## 2024-11-06 PROCEDURE — 99214 OFFICE O/P EST MOD 30 MIN: CPT

## 2024-11-06 RX ORDER — IBUPROFEN 800 MG/1
800 TABLET, FILM COATED ORAL
Qty: 60 | Refills: 2 | Status: ACTIVE | COMMUNITY
Start: 2024-11-06 | End: 1900-01-01

## 2024-11-20 PROCEDURE — 73221 MRI JOINT UPR EXTREM W/O DYE: CPT

## 2024-12-10 ENCOUNTER — APPOINTMENT (OUTPATIENT)
Dept: UROLOGY | Facility: CLINIC | Age: 64
End: 2024-12-10

## 2024-12-18 ENCOUNTER — APPOINTMENT (OUTPATIENT)
Dept: ORTHOPEDIC SURGERY | Facility: CLINIC | Age: 64
End: 2024-12-18
Payer: MEDICARE

## 2024-12-18 VITALS — WEIGHT: 225 LBS | HEIGHT: 66 IN | BODY MASS INDEX: 36.16 KG/M2

## 2024-12-18 DIAGNOSIS — S49.92XA UNSPECIFIED INJURY OF LEFT SHOULDER AND UPPER ARM, INITIAL ENCOUNTER: ICD-10-CM

## 2024-12-18 PROCEDURE — 99214 OFFICE O/P EST MOD 30 MIN: CPT | Mod: 25

## 2024-12-18 PROCEDURE — 20610 DRAIN/INJ JOINT/BURSA W/O US: CPT | Mod: LT

## 2025-02-26 ENCOUNTER — APPOINTMENT (OUTPATIENT)
Dept: ORTHOPEDIC SURGERY | Facility: CLINIC | Age: 65
End: 2025-02-26

## 2025-03-05 ENCOUNTER — APPOINTMENT (OUTPATIENT)
Dept: ORTHOPEDIC SURGERY | Facility: CLINIC | Age: 65
End: 2025-03-05
Payer: MEDICARE

## 2025-03-05 VITALS — WEIGHT: 205 LBS | BODY MASS INDEX: 32.95 KG/M2 | HEIGHT: 66 IN

## 2025-03-05 DIAGNOSIS — Z96.652 PRESENCE OF LEFT ARTIFICIAL KNEE JOINT: ICD-10-CM

## 2025-03-05 DIAGNOSIS — M17.0 BILATERAL PRIMARY OSTEOARTHRITIS OF KNEE: ICD-10-CM

## 2025-03-05 PROCEDURE — 73562 X-RAY EXAM OF KNEE 3: CPT | Mod: LT

## 2025-03-05 PROCEDURE — 73564 X-RAY EXAM KNEE 4 OR MORE: CPT | Mod: RT

## 2025-03-05 PROCEDURE — 20610 DRAIN/INJ JOINT/BURSA W/O US: CPT | Mod: RT

## 2025-03-05 PROCEDURE — 99214 OFFICE O/P EST MOD 30 MIN: CPT | Mod: 25

## 2025-03-10 ENCOUNTER — APPOINTMENT (OUTPATIENT)
Dept: ORTHOPEDIC SURGERY | Facility: CLINIC | Age: 65
End: 2025-03-10
Payer: MEDICARE

## 2025-03-10 PROBLEM — M17.11 OSTEOARTHRITIS OF RIGHT KNEE: Status: ACTIVE | Noted: 2025-03-10

## 2025-03-10 PROCEDURE — 20610 DRAIN/INJ JOINT/BURSA W/O US: CPT | Mod: RT

## 2025-03-17 ENCOUNTER — RX RENEWAL (OUTPATIENT)
Age: 65
End: 2025-03-17

## 2025-03-17 ENCOUNTER — APPOINTMENT (OUTPATIENT)
Dept: ORTHOPEDIC SURGERY | Facility: CLINIC | Age: 65
End: 2025-03-17
Payer: MEDICARE

## 2025-03-17 PROCEDURE — 20610 DRAIN/INJ JOINT/BURSA W/O US: CPT | Mod: RT

## 2025-03-24 ENCOUNTER — APPOINTMENT (OUTPATIENT)
Dept: ORTHOPEDIC SURGERY | Facility: CLINIC | Age: 65
End: 2025-03-24
Payer: MEDICARE

## 2025-03-24 DIAGNOSIS — M17.11 UNILATERAL PRIMARY OSTEOARTHRITIS, RIGHT KNEE: ICD-10-CM

## 2025-03-24 PROCEDURE — 20610 DRAIN/INJ JOINT/BURSA W/O US: CPT | Mod: RT
